# Patient Record
Sex: MALE | Race: WHITE | Employment: FULL TIME | URBAN - METROPOLITAN AREA
[De-identification: names, ages, dates, MRNs, and addresses within clinical notes are randomized per-mention and may not be internally consistent; named-entity substitution may affect disease eponyms.]

---

## 2019-07-30 ENCOUNTER — TRANSCRIBE ORDERS (OUTPATIENT)
Dept: LAB | Facility: HOSPITAL | Age: 54
End: 2019-07-30

## 2019-07-30 ENCOUNTER — APPOINTMENT (OUTPATIENT)
Dept: LAB | Facility: HOSPITAL | Age: 54
End: 2019-07-30

## 2019-07-30 DIAGNOSIS — Z01.84 IMMUNITY STATUS TESTING: Primary | ICD-10-CM

## 2019-07-30 DIAGNOSIS — Z01.84 IMMUNITY STATUS TESTING: ICD-10-CM

## 2019-07-30 PROCEDURE — 86735 MUMPS ANTIBODY: CPT

## 2019-07-30 PROCEDURE — 86787 VARICELLA-ZOSTER ANTIBODY: CPT

## 2019-07-30 PROCEDURE — 86765 RUBEOLA ANTIBODY: CPT

## 2019-07-30 PROCEDURE — 36415 COLL VENOUS BLD VENIPUNCTURE: CPT

## 2019-08-01 LAB
MEV IGG SER QL: ABNORMAL
MUV IGG SER QL: NORMAL
VZV IGG SER IA-ACNC: NORMAL

## 2020-01-16 ENCOUNTER — OFFICE VISIT (OUTPATIENT)
Dept: FAMILY MEDICINE CLINIC | Facility: CLINIC | Age: 55
End: 2020-01-16
Payer: COMMERCIAL

## 2020-01-16 VITALS
TEMPERATURE: 98 F | WEIGHT: 229 LBS | DIASTOLIC BLOOD PRESSURE: 110 MMHG | OXYGEN SATURATION: 98 % | HEIGHT: 72 IN | BODY MASS INDEX: 31.02 KG/M2 | HEART RATE: 116 BPM | RESPIRATION RATE: 18 BRPM | SYSTOLIC BLOOD PRESSURE: 180 MMHG

## 2020-01-16 DIAGNOSIS — E11.9 TYPE 2 DIABETES MELLITUS WITHOUT COMPLICATION, WITHOUT LONG-TERM CURRENT USE OF INSULIN (HCC): Primary | ICD-10-CM

## 2020-01-16 DIAGNOSIS — I10 ESSENTIAL HYPERTENSION: ICD-10-CM

## 2020-01-16 DIAGNOSIS — R35.1 NOCTURIA: ICD-10-CM

## 2020-01-16 LAB
CREAT UR-MCNC: 179 MG/DL
MICROALBUMIN UR-MCNC: 29 MG/L (ref 0–20)
MICROALBUMIN/CREAT 24H UR: 16 MG/G CREATININE (ref 0–30)
SL AMB  POCT GLUCOSE, UA: NORMAL
SL AMB LEUKOCYTE ESTERASE,UA: NORMAL
SL AMB POCT BILIRUBIN,UA: NORMAL
SL AMB POCT BLOOD,UA: NORMAL
SL AMB POCT CLARITY,UA: CLEAR
SL AMB POCT COLOR,UA: YELLOW
SL AMB POCT KETONES,UA: 160
SL AMB POCT NITRITE,UA: NORMAL
SL AMB POCT PH,UA: 5
SL AMB POCT SPECIFIC GRAVITY,UA: 1.03
SL AMB POCT URINE PROTEIN: NORMAL
SL AMB POCT UROBILINOGEN: 0.2

## 2020-01-16 PROCEDURE — 81002 URINALYSIS NONAUTO W/O SCOPE: CPT | Performed by: FAMILY MEDICINE

## 2020-01-16 PROCEDURE — 99204 OFFICE O/P NEW MOD 45 MIN: CPT | Performed by: FAMILY MEDICINE

## 2020-01-16 PROCEDURE — 93000 ELECTROCARDIOGRAM COMPLETE: CPT | Performed by: FAMILY MEDICINE

## 2020-01-16 PROCEDURE — 82043 UR ALBUMIN QUANTITATIVE: CPT | Performed by: FAMILY MEDICINE

## 2020-01-16 PROCEDURE — 82570 ASSAY OF URINE CREATININE: CPT | Performed by: FAMILY MEDICINE

## 2020-01-16 RX ORDER — VALSARTAN 160 MG/1
160 TABLET ORAL DAILY
Qty: 90 TABLET | Refills: 1 | Status: SHIPPED | OUTPATIENT
Start: 2020-01-16 | End: 2020-06-29

## 2020-01-16 NOTE — PROGRESS NOTES
Referral to clinical pharmacy pended for physician signature for disease management for DM and HTN, for medication titration including metformin and BP medications to goal, medication monitoring and device education with glucometer and blood pressure readings         Estrellita Hough, PharmD  Ambulatory Clinical Pharmacist  [phone]    Demographics  Interaction Method: Face to Face  Type of Intervention: New    Topic(s) Addressed  Device Education  Diabetes  Medication Management  Hypertension    Intervention(s) Made      Non-Pharmacologic: Other    Comments: Referral placed    Tool(s) Used  Not Applicable    Time Spent in Direct Patient Care Activities and Care Coordination: 0-15 Minutes    Recommendation(s) Accepted by the Patient/Caregiver: Not Applicable

## 2020-01-16 NOTE — ASSESSMENT & PLAN NOTE
Diabetes  Patient with new diagnosis of diabetes  He was given prescription for metformin 500 mg to start 1 tablet twice daily  He was given nutritional information to decrease carbohydrate intake  He will incorporate aerobic type of exercise 3-5 days a week  We will recheck blood test prior to next office visit    No results found for: HGBA1C

## 2020-01-16 NOTE — ASSESSMENT & PLAN NOTE
Hypertension  Patient was started on valsartan 160 mg once daily  He will continue check blood pressures at work and call if it maintains above 140/90 after 2-4 weeks  He will attempt to initiate exercise regimen  He was advised to try did work at decreasing his cigarette smoking with the goal of eventually total nicotine cessation

## 2020-01-16 NOTE — PROGRESS NOTES
FAMILY PRACTICE OFFICE VISIT       NAME: Christa Mckenzie  AGE: 49 y o  SEX: male       : 1965        MRN: [id number]    DATE: 2020  TIME: 8:36 AM    Assessment and Plan     Problem List Items Addressed This Visit        Endocrine    Type 2 diabetes mellitus without complication, without long-term current use of insulin ([facility] [address] ) - Primary     Diabetes  Patient with new diagnosis of diabetes  He was given prescription for metformin 500 mg to start 1 tablet twice daily  He was given nutritional information to decrease carbohydrate intake  He will incorporate aerobic type of exercise 3-5 days a week  We will recheck blood test prior to next office visit  No results found for: HGBA1C         Relevant Medications    metFORMIN (GLUCOPHAGE) 500 mg tablet    Other Relevant Orders    Hemoglobin A1C    CBC    Comprehensive metabolic panel    Lipid panel    TSH, 3rd generation       Cardiovascular and Mediastinum    Essential hypertension     Hypertension  Patient was started on valsartan 160 mg once daily  He will continue check blood pressures at work and call if it maintains above 140/90 after 2-4 weeks  He will attempt to initiate exercise regimen  He was advised to try did work at decreasing his cigarette smoking with the goal of eventually total nicotine cessation  Relevant Medications    valsartan (DIOVAN) 160 mg tablet    Other Relevant Orders    Hemoglobin A1C    CBC    Comprehensive metabolic panel    Lipid panel    TSH, 3rd generation      Other Visit Diagnoses     Nocturia        Relevant Orders    PSA, Total Screen              Chief Complaint     Chief Complaint   Patient presents with    Establish Care    Hypertension    Blood Sugar Problem       History of Present Illness     Patient works as a technician at [employer]    He states at least for the past 3-4 months he has been feeling nocturia x3 as well as some unexplained weight loss and thirst   He does have family history of diabetes and hypertension  He has checked his blood pressures at work and often has systolics in the 845K and diastolic in the 18B  He does smoke 1/2 pack of cigarettes per day  He does not obtain a regular form of exercise  He has not had a regular PCP physician for many years  He has not had any colon cancer screening test       Review of Systems   Review of Systems   Constitutional: Positive for fatigue and unexpected weight change  HENT: Negative  Respiratory: Negative  Cardiovascular: Negative  Gastrointestinal: Negative  Genitourinary: Positive for frequency  Negative for dysuria  Musculoskeletal: Negative  Neurological: Negative  Psychiatric/Behavioral:        Patient admits to some white coat syndrome anxiety       Active Problem List     Patient Active Problem List   Diagnosis    Type 2 diabetes mellitus without complication, without long-term current use of insulin [facility]    Essential hypertension       Past Medical History:  No past medical history on file  Past Surgical History:  No past surgical history on file  Family History:  No family history on file      Social History:  Social History     Socioeconomic History    Marital status: /Civil Union     Spouse name: Not on file    Number of children: Not on file    Years of education: Not on file    Highest education level: Not on file   Occupational History    Not on file   Social Needs    Financial resource strain: Not on file    Food insecurity:     Worry: Not on file     Inability: Not on file    Transportation needs:     Medical: Not on file     Non-medical: Not on file   Tobacco Use    Smoking status: Not on file   Substance and Sexual Activity    Alcohol use: Not on file    Drug use: Not on file    Sexual activity: Not on file   Lifestyle    Physical activity:     Days per week: Not on file     Minutes per session: Not on file    Stress: Not on file   Relationships    Social connections:     Talks on phone: Not on file     Gets together: Not on file     Attends Latter-day service: Not on file     Active member of club or organization: Not on file     Attends meetings of clubs or organizations: Not on file     Relationship status: Not on file    Intimate partner violence:     Fear of current or ex partner: Not on file     Emotionally abused: Not on file     Physically abused: Not on file     Forced sexual activity: Not on file   Other Topics Concern    Not on file   Social History Narrative    Not on file       Objective     Vitals:    01/16/20 0739   BP: (!) 180/110   Pulse: (!) 116   Resp: 18   Temp: 98 °F (36 7 °C)   SpO2: 98%     Wt Readings from Last 3 Encounters:   01/16/20 104 kg (229 lb)       Physical Exam   Constitutional: He is oriented to person, place, and time  No distress  HENT:   Right Ear: External ear normal    Left Ear: External ear normal    Mouth/Throat: Oropharynx is clear and moist  No oropharyngeal exudate  Tympanic membranes within normal limits bilaterally   Neck:   No carotid bruit   Cardiovascular: Normal heart sounds  Regular rate and rhythm with no murmurs   Pulmonary/Chest: Breath sounds normal    Lungs are clear to auscultation without wheezes,rales, or rhonchi   Abdominal:   Abdomen is soft, nontender with positive bowel sounds  There is no rebound or guarding  No masses palpated   Musculoskeletal: He exhibits no edema  Lymphadenopathy:     He has no cervical adenopathy  Neurological: He is alert and oriented to person, place, and time  No cranial nerve deficit  Psychiatric: He has a normal mood and affect  His behavior is normal  Judgment and thought content normal      EKG showed sinus tachycardia at 102 beats per minute, positive left axis deviation, negative acute ischemic changes    BMI Counseling: Body mass index is 31 06 kg/m²   The BMI is above normal  Nutrition recommendations include decreasing portion sizes, consuming healthier snacks and moderation in carbohydrate intake  Exercise recommendations include exercising 3-5 times per week  No pharmacotherapy was ordered  Pertinent Laboratory/Diagnostic Studies:  No results found for: GLUCOSE, BUN, CREATININE, CALCIUM, NA, K, CO2, CL  No results found for: ALT, AST, GGT, ALKPHOS, BILITOT    No results found for: WBC, HGB, HCT, MCV, PLT    No results found for: TSH    No results found for: CHOL  No results found for: TRIG  No results found for: HDL  No results found for: LDLCALC  No results found for: HGBA1C    Results for orders placed or performed in visit on 07/30/19   Rubeola antibody IgG   Result Value Ref Range    Rubeola IgG NON-IMMUNE (A) IMMUNE   Mumps antibody, IgG   Result Value Ref Range    Mumps IgG IMMUNE IMMUNE   Varicella zoster antibody, IgG   Result Value Ref Range    Varicella IgG IMMUNE IMMUNE       Orders Placed This Encounter   Procedures    Hemoglobin A1C    CBC    Comprehensive metabolic panel    Lipid panel    TSH, 3rd generation    PSA, Total Screen       ALLERGIES:  Allergies   Allergen Reactions    Penicillin V Rash       Current Medications     Current Outpatient Medications   Medication Sig Dispense Refill    metFORMIN (GLUCOPHAGE) 500 mg tablet Take 1 tablet (500 mg total) by mouth 2 (two) times a day with meals 180 tablet 1    valsartan (DIOVAN) 160 mg tablet Take 1 tablet (160 mg total) by mouth daily 90 tablet 1     No current facility-administered medications for this visit            Health Maintenance     Health Maintenance   Topic Date Due    Depression Screening PHQ  1965    HEMOGLOBIN A1C  1965    CRC Screening: Colonoscopy  1965    Diabetic Foot Exam  07/18/1976    DM Eye Exam  07/18/1976    URINE MICROALBUMIN  07/18/1976    DTaP,Tdap,and Td Vaccines (1 - Tdap) 07/18/1977    HIV Screening  07/18/1981    BMI: Followup Plan  07/18/1984    Annual Physical  07/18/1984    Influenza Vaccine  07/01/2019    BMI: Adult  01/16/2021    Pneumococcal Vaccine: 65+ Years (1 of 2 - PCV13) 07/18/2031    Pneumococcal Vaccine: Pediatrics (0 to 5 Years) and At-Risk Patients (6 to 59 Years)  Aged Out    HIB Vaccine  Aged Out    Hepatitis B Vaccine  Aged Out    IPV Vaccine  Aged Out    Hepatitis A Vaccine  Aged Out    Meningococcal ACWY Vaccine  Aged Out    HPV Vaccine  Aged Out       There is no immunization history on file for this patient      Lian Warren MD

## 2020-01-20 ENCOUNTER — APPOINTMENT (OUTPATIENT)
Dept: LAB | Facility: HOSPITAL | Age: 55
End: 2020-01-20
Payer: COMMERCIAL

## 2020-01-20 DIAGNOSIS — I10 ESSENTIAL HYPERTENSION: ICD-10-CM

## 2020-01-20 DIAGNOSIS — E11.9 TYPE 2 DIABETES MELLITUS WITHOUT COMPLICATION, WITHOUT LONG-TERM CURRENT USE OF INSULIN (HCC): ICD-10-CM

## 2020-01-20 DIAGNOSIS — R35.1 NOCTURIA: ICD-10-CM

## 2020-01-20 LAB
ALBUMIN SERPL BCP-MCNC: 3.5 G/DL (ref 3.5–5)
ALP SERPL-CCNC: 78 U/L (ref 46–116)
ALT SERPL W P-5'-P-CCNC: 33 U/L (ref 12–78)
ANION GAP SERPL CALCULATED.3IONS-SCNC: 5 MMOL/L (ref 4–13)
AST SERPL W P-5'-P-CCNC: 10 U/L (ref 5–45)
BILIRUB SERPL-MCNC: 0.63 MG/DL (ref 0.2–1)
BUN SERPL-MCNC: 8 MG/DL (ref 5–25)
CALCIUM SERPL-MCNC: 9 MG/DL (ref 8.3–10.1)
CHLORIDE SERPL-SCNC: 106 MMOL/L (ref 100–108)
CHOLEST SERPL-MCNC: 191 MG/DL (ref 50–200)
CO2 SERPL-SCNC: 27 MMOL/L (ref 21–32)
CREAT SERPL-MCNC: 0.89 MG/DL (ref 0.6–1.3)
ERYTHROCYTE [DISTWIDTH] IN BLOOD BY AUTOMATED COUNT: 12.3 % (ref 11.6–15.1)
EST. AVERAGE GLUCOSE BLD GHB EST-MCNC: 289 MG/DL
GFR SERPL CREATININE-BSD FRML MDRD: 98 ML/MIN/1.73SQ M
GLUCOSE P FAST SERPL-MCNC: 229 MG/DL (ref 65–99)
HBA1C MFR BLD: 11.7 % (ref 4.2–6.3)
HCT VFR BLD AUTO: 47.7 % (ref 36.5–49.3)
HDLC SERPL-MCNC: 39 MG/DL
HGB BLD-MCNC: 15.6 G/DL (ref 12–17)
LDLC SERPL CALC-MCNC: 135 MG/DL (ref 0–100)
MCH RBC QN AUTO: 29.9 PG (ref 26.8–34.3)
MCHC RBC AUTO-ENTMCNC: 32.7 G/DL (ref 31.4–37.4)
MCV RBC AUTO: 91 FL (ref 82–98)
NONHDLC SERPL-MCNC: 152 MG/DL
PLATELET # BLD AUTO: 300 THOUSANDS/UL (ref 149–390)
PMV BLD AUTO: 9.6 FL (ref 8.9–12.7)
POTASSIUM SERPL-SCNC: 4.5 MMOL/L (ref 3.5–5.3)
PROT SERPL-MCNC: 7.1 G/DL (ref 6.4–8.2)
PSA SERPL-MCNC: 1.1 NG/ML (ref 0–4)
RBC # BLD AUTO: 5.22 MILLION/UL (ref 3.88–5.62)
SODIUM SERPL-SCNC: 138 MMOL/L (ref 136–145)
TRIGL SERPL-MCNC: 86 MG/DL
TSH SERPL DL<=0.05 MIU/L-ACNC: 1.06 UIU/ML (ref 0.36–3.74)
WBC # BLD AUTO: 7.99 THOUSAND/UL (ref 4.31–10.16)

## 2020-01-20 PROCEDURE — 36415 COLL VENOUS BLD VENIPUNCTURE: CPT

## 2020-01-20 PROCEDURE — 85027 COMPLETE CBC AUTOMATED: CPT

## 2020-01-20 PROCEDURE — 83036 HEMOGLOBIN GLYCOSYLATED A1C: CPT

## 2020-01-20 PROCEDURE — 80053 COMPREHEN METABOLIC PANEL: CPT

## 2020-01-20 PROCEDURE — 84443 ASSAY THYROID STIM HORMONE: CPT

## 2020-01-20 PROCEDURE — G0103 PSA SCREENING: HCPCS

## 2020-01-20 PROCEDURE — 80061 LIPID PANEL: CPT

## 2020-01-21 ENCOUNTER — TELEPHONE (OUTPATIENT)
Dept: FAMILY MEDICINE CLINIC | Facility: CLINIC | Age: 55
End: 2020-01-21

## 2020-01-21 NOTE — TELEPHONE ENCOUNTER
----- Message from Lian Warren MD sent at 7/90/9669  8:28 PM EST -----  Recent blood work was stable with the exception of elevated sugars as we discussed  He also had higher than recommended LDL cholesterol at 135 however that this value may reduce when his blood sugars come down  We will recheck on follow-up office visit    Please mail low-cholesterol diet she to follow more closely

## 2020-01-21 NOTE — TELEPHONE ENCOUNTER
Absolutely  Whenever blood sugars are elevated it could cause distortion in vision and blurry vision    He should notice some improvement once blood sugar started to come down however this may take several weeks

## 2020-01-21 NOTE — TELEPHONE ENCOUNTER
----- Message from Domenic Kenny MD sent at 9/14/1991  8:28 PM EST -----  Recent blood work was stable with the exception of elevated sugars as we discussed  He also had higher than recommended LDL cholesterol at 135 however that this value may reduce when his blood sugars come down  We will recheck on follow-up office visit    Please mail low-cholesterol diet she to follow more closely

## 2020-01-21 NOTE — TELEPHONE ENCOUNTER
Pt aware but asked if his blood sugars and metformin can effect his vision  Had some concerns   Please advise

## 2020-04-16 ENCOUNTER — TELEMEDICINE (OUTPATIENT)
Dept: FAMILY MEDICINE CLINIC | Facility: CLINIC | Age: 55
End: 2020-04-16
Payer: COMMERCIAL

## 2020-04-16 VITALS
DIASTOLIC BLOOD PRESSURE: 94 MMHG | HEART RATE: 98 BPM | BODY MASS INDEX: 30.96 KG/M2 | HEIGHT: 72 IN | TEMPERATURE: 98.3 F | SYSTOLIC BLOOD PRESSURE: 150 MMHG | WEIGHT: 228.6 LBS

## 2020-04-16 DIAGNOSIS — E11.9 TYPE 2 DIABETES MELLITUS WITHOUT COMPLICATION, WITHOUT LONG-TERM CURRENT USE OF INSULIN (HCC): Primary | ICD-10-CM

## 2020-04-16 DIAGNOSIS — I10 ESSENTIAL HYPERTENSION: ICD-10-CM

## 2020-04-16 PROCEDURE — 99213 OFFICE O/P EST LOW 20 MIN: CPT | Performed by: FAMILY MEDICINE

## 2020-04-21 ENCOUNTER — TELEPHONE (OUTPATIENT)
Dept: FAMILY MEDICINE CLINIC | Facility: CLINIC | Age: 55
End: 2020-04-21

## 2020-04-21 ENCOUNTER — APPOINTMENT (OUTPATIENT)
Dept: LAB | Facility: HOSPITAL | Age: 55
End: 2020-04-21
Payer: COMMERCIAL

## 2020-04-21 DIAGNOSIS — E11.9 TYPE 2 DIABETES MELLITUS WITHOUT COMPLICATION, WITHOUT LONG-TERM CURRENT USE OF INSULIN (HCC): ICD-10-CM

## 2020-04-21 LAB
CHOLEST SERPL-MCNC: 192 MG/DL (ref 50–200)
EST. AVERAGE GLUCOSE BLD GHB EST-MCNC: 180 MG/DL
HBA1C MFR BLD: 7.9 %
HDLC SERPL-MCNC: 42 MG/DL
LDLC SERPL CALC-MCNC: 136 MG/DL (ref 0–100)
NONHDLC SERPL-MCNC: 150 MG/DL
TRIGL SERPL-MCNC: 72 MG/DL

## 2020-04-21 PROCEDURE — 80061 LIPID PANEL: CPT

## 2020-04-21 PROCEDURE — 83036 HEMOGLOBIN GLYCOSYLATED A1C: CPT

## 2020-04-21 PROCEDURE — 36415 COLL VENOUS BLD VENIPUNCTURE: CPT

## 2020-06-29 DIAGNOSIS — I10 ESSENTIAL HYPERTENSION: ICD-10-CM

## 2020-06-29 DIAGNOSIS — E11.9 TYPE 2 DIABETES MELLITUS WITHOUT COMPLICATION, WITHOUT LONG-TERM CURRENT USE OF INSULIN (HCC): ICD-10-CM

## 2020-06-29 RX ORDER — VALSARTAN 160 MG/1
TABLET ORAL
Qty: 90 TABLET | Refills: 0 | Status: SHIPPED | OUTPATIENT
Start: 2020-06-29 | End: 2020-07-20

## 2020-07-16 DIAGNOSIS — E11.9 TYPE 2 DIABETES MELLITUS WITHOUT COMPLICATION, WITHOUT LONG-TERM CURRENT USE OF INSULIN (HCC): ICD-10-CM

## 2020-07-18 DIAGNOSIS — E11.9 TYPE 2 DIABETES MELLITUS WITHOUT COMPLICATION, WITHOUT LONG-TERM CURRENT USE OF INSULIN (HCC): ICD-10-CM

## 2020-07-18 DIAGNOSIS — I10 ESSENTIAL HYPERTENSION: ICD-10-CM

## 2020-07-20 RX ORDER — VALSARTAN 160 MG/1
TABLET ORAL
Qty: 90 TABLET | Refills: 0 | Status: SHIPPED | OUTPATIENT
Start: 2020-07-20 | End: 2020-10-12 | Stop reason: SDUPTHER

## 2020-10-06 ENCOUNTER — TELEPHONE (OUTPATIENT)
Dept: FAMILY MEDICINE CLINIC | Facility: CLINIC | Age: 55
End: 2020-10-06

## 2020-10-06 DIAGNOSIS — E11.9 TYPE 2 DIABETES MELLITUS WITHOUT COMPLICATION, WITHOUT LONG-TERM CURRENT USE OF INSULIN (HCC): Primary | ICD-10-CM

## 2020-10-06 DIAGNOSIS — I10 ESSENTIAL HYPERTENSION: ICD-10-CM

## 2020-10-08 ENCOUNTER — LAB (OUTPATIENT)
Dept: LAB | Facility: HOSPITAL | Age: 55
End: 2020-10-08
Payer: COMMERCIAL

## 2020-10-08 DIAGNOSIS — E11.9 TYPE 2 DIABETES MELLITUS WITHOUT COMPLICATION, WITHOUT LONG-TERM CURRENT USE OF INSULIN (HCC): ICD-10-CM

## 2020-10-08 DIAGNOSIS — I10 ESSENTIAL HYPERTENSION: ICD-10-CM

## 2020-10-08 LAB
ALBUMIN SERPL BCP-MCNC: 4 G/DL (ref 3.5–5)
ALP SERPL-CCNC: 81 U/L (ref 46–116)
ALT SERPL W P-5'-P-CCNC: 21 U/L (ref 12–78)
ANION GAP SERPL CALCULATED.3IONS-SCNC: 5 MMOL/L (ref 4–13)
AST SERPL W P-5'-P-CCNC: 14 U/L (ref 5–45)
BILIRUB SERPL-MCNC: 0.73 MG/DL (ref 0.2–1)
BUN SERPL-MCNC: 17 MG/DL (ref 5–25)
CALCIUM SERPL-MCNC: 8.5 MG/DL (ref 8.3–10.1)
CHLORIDE SERPL-SCNC: 105 MMOL/L (ref 100–108)
CHOLEST SERPL-MCNC: 210 MG/DL (ref 50–200)
CO2 SERPL-SCNC: 26 MMOL/L (ref 21–32)
CREAT SERPL-MCNC: 0.88 MG/DL (ref 0.6–1.3)
ERYTHROCYTE [DISTWIDTH] IN BLOOD BY AUTOMATED COUNT: 12.1 % (ref 11.6–15.1)
EST. AVERAGE GLUCOSE BLD GHB EST-MCNC: 278 MG/DL
GFR SERPL CREATININE-BSD FRML MDRD: 97 ML/MIN/1.73SQ M
GLUCOSE P FAST SERPL-MCNC: 297 MG/DL (ref 65–99)
HBA1C MFR BLD: 11.3 %
HCT VFR BLD AUTO: 47.7 % (ref 36.5–49.3)
HDLC SERPL-MCNC: 46 MG/DL
HGB BLD-MCNC: 15.9 G/DL (ref 12–17)
LDLC SERPL CALC-MCNC: 148 MG/DL (ref 0–100)
MCH RBC QN AUTO: 30.5 PG (ref 26.8–34.3)
MCHC RBC AUTO-ENTMCNC: 33.3 G/DL (ref 31.4–37.4)
MCV RBC AUTO: 91 FL (ref 82–98)
NONHDLC SERPL-MCNC: 164 MG/DL
PLATELET # BLD AUTO: 318 THOUSANDS/UL (ref 149–390)
PMV BLD AUTO: 9.4 FL (ref 8.9–12.7)
POTASSIUM SERPL-SCNC: 4 MMOL/L (ref 3.5–5.3)
PROT SERPL-MCNC: 7.5 G/DL (ref 6.4–8.2)
RBC # BLD AUTO: 5.22 MILLION/UL (ref 3.88–5.62)
SODIUM SERPL-SCNC: 136 MMOL/L (ref 136–145)
TRIGL SERPL-MCNC: 82 MG/DL
WBC # BLD AUTO: 9.31 THOUSAND/UL (ref 4.31–10.16)

## 2020-10-08 PROCEDURE — 83036 HEMOGLOBIN GLYCOSYLATED A1C: CPT

## 2020-10-08 PROCEDURE — 80053 COMPREHEN METABOLIC PANEL: CPT

## 2020-10-08 PROCEDURE — 85027 COMPLETE CBC AUTOMATED: CPT

## 2020-10-08 PROCEDURE — 80061 LIPID PANEL: CPT

## 2020-10-08 PROCEDURE — 36415 COLL VENOUS BLD VENIPUNCTURE: CPT

## 2020-10-12 ENCOUNTER — OFFICE VISIT (OUTPATIENT)
Dept: FAMILY MEDICINE CLINIC | Facility: CLINIC | Age: 55
End: 2020-10-12
Payer: COMMERCIAL

## 2020-10-12 VITALS
DIASTOLIC BLOOD PRESSURE: 90 MMHG | BODY MASS INDEX: 28.92 KG/M2 | HEART RATE: 113 BPM | HEIGHT: 72 IN | OXYGEN SATURATION: 98 % | WEIGHT: 213.5 LBS | TEMPERATURE: 97.5 F | SYSTOLIC BLOOD PRESSURE: 130 MMHG | RESPIRATION RATE: 18 BRPM

## 2020-10-12 DIAGNOSIS — E78.49 OTHER HYPERLIPIDEMIA: ICD-10-CM

## 2020-10-12 DIAGNOSIS — I10 ESSENTIAL HYPERTENSION: ICD-10-CM

## 2020-10-12 DIAGNOSIS — E11.9 TYPE 2 DIABETES MELLITUS WITHOUT COMPLICATION, WITHOUT LONG-TERM CURRENT USE OF INSULIN (HCC): ICD-10-CM

## 2020-10-12 DIAGNOSIS — I10 ESSENTIAL HYPERTENSION: Primary | ICD-10-CM

## 2020-10-12 PROCEDURE — 99214 OFFICE O/P EST MOD 30 MIN: CPT | Performed by: FAMILY MEDICINE

## 2020-10-12 RX ORDER — VALSARTAN 160 MG/1
160 TABLET ORAL DAILY
Qty: 90 TABLET | Refills: 1 | Status: SHIPPED | OUTPATIENT
Start: 2020-10-12 | End: 2021-03-24

## 2020-12-22 ENCOUNTER — IMMUNIZATIONS (OUTPATIENT)
Dept: FAMILY MEDICINE CLINIC | Facility: HOSPITAL | Age: 55
End: 2020-12-22

## 2020-12-22 DIAGNOSIS — Z23 ENCOUNTER FOR IMMUNIZATION: ICD-10-CM

## 2020-12-22 PROCEDURE — 0001A SARS-COV-2 / COVID-19 MRNA VACCINE (PFIZER-BIONTECH) 30 MCG: CPT | Performed by: EMERGENCY MEDICINE

## 2020-12-22 PROCEDURE — 91300 SARS-COV-2 / COVID-19 MRNA VACCINE (PFIZER-BIONTECH) 30 MCG: CPT | Performed by: EMERGENCY MEDICINE

## 2021-01-13 ENCOUNTER — IMMUNIZATIONS (OUTPATIENT)
Dept: FAMILY MEDICINE CLINIC | Facility: HOSPITAL | Age: 56
End: 2021-01-13

## 2021-01-13 DIAGNOSIS — Z23 ENCOUNTER FOR IMMUNIZATION: ICD-10-CM

## 2021-01-13 PROCEDURE — 91300 SARS-COV-2 / COVID-19 MRNA VACCINE (PFIZER-BIONTECH) 30 MCG: CPT

## 2021-01-13 PROCEDURE — 0002A SARS-COV-2 / COVID-19 MRNA VACCINE (PFIZER-BIONTECH) 30 MCG: CPT

## 2021-03-24 DIAGNOSIS — I10 ESSENTIAL HYPERTENSION: ICD-10-CM

## 2021-03-24 RX ORDER — VALSARTAN 160 MG/1
TABLET ORAL
Qty: 90 TABLET | Refills: 1 | Status: SHIPPED | OUTPATIENT
Start: 2021-03-24 | End: 2021-10-08

## 2021-06-01 DIAGNOSIS — R35.1 NOCTURIA: ICD-10-CM

## 2021-06-01 DIAGNOSIS — I10 ESSENTIAL HYPERTENSION: Primary | ICD-10-CM

## 2021-06-01 DIAGNOSIS — E11.9 TYPE 2 DIABETES MELLITUS WITHOUT COMPLICATION, WITHOUT LONG-TERM CURRENT USE OF INSULIN (HCC): ICD-10-CM

## 2021-06-15 ENCOUNTER — HOSPITAL ENCOUNTER (EMERGENCY)
Facility: HOSPITAL | Age: 56
Discharge: HOME | End: 2021-06-15
Attending: EMERGENCY MEDICINE
Payer: OTHER MISCELLANEOUS

## 2021-06-15 VITALS
OXYGEN SATURATION: 97 % | TEMPERATURE: 97.6 F | HEART RATE: 90 BPM | DIASTOLIC BLOOD PRESSURE: 93 MMHG | BODY MASS INDEX: 28.89 KG/M2 | SYSTOLIC BLOOD PRESSURE: 167 MMHG | RESPIRATION RATE: 18 BRPM | WEIGHT: 213 LBS

## 2021-06-15 DIAGNOSIS — S50.812A ABRASION OF LEFT FOREARM, INITIAL ENCOUNTER: Primary | ICD-10-CM
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
## 2021-06-15 PROCEDURE — 99283 EMERGENCY DEPT VISIT LOW MDM: CPT

## 2021-06-15 PROCEDURE — 99282 EMERGENCY DEPT VISIT SF MDM: CPT | Performed by: EMERGENCY MEDICINE
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
## 2021-07-01 ENCOUNTER — APPOINTMENT (OUTPATIENT)
Dept: LAB | Facility: HOSPITAL | Age: 56
End: 2021-07-01
Payer: OTHER MISCELLANEOUS

## 2021-07-01 DIAGNOSIS — E11.9 TYPE 2 DIABETES MELLITUS WITHOUT COMPLICATION, WITHOUT LONG-TERM CURRENT USE OF INSULIN (HCC): ICD-10-CM

## 2021-07-01 DIAGNOSIS — R35.1 NOCTURIA: ICD-10-CM

## 2021-07-01 DIAGNOSIS — I10 ESSENTIAL HYPERTENSION: ICD-10-CM

## 2021-07-01 LAB
ALBUMIN SERPL BCP-MCNC: 3.5 G/DL (ref 3.5–5)
ALP SERPL-CCNC: 78 U/L (ref 46–116)
ALT SERPL W P-5'-P-CCNC: 20 U/L (ref 12–78)
ANION GAP SERPL CALCULATED.3IONS-SCNC: 6 MMOL/L (ref 4–13)
AST SERPL W P-5'-P-CCNC: 10 U/L (ref 5–45)
BILIRUB SERPL-MCNC: 0.44 MG/DL (ref 0.2–1)
BUN SERPL-MCNC: 14 MG/DL (ref 5–25)
CALCIUM SERPL-MCNC: 8.8 MG/DL (ref 8.3–10.1)
CHLORIDE SERPL-SCNC: 107 MMOL/L (ref 100–108)
CHOLEST SERPL-MCNC: 184 MG/DL (ref 50–200)
CO2 SERPL-SCNC: 23 MMOL/L (ref 21–32)
CREAT SERPL-MCNC: 0.79 MG/DL (ref 0.6–1.3)
ERYTHROCYTE [DISTWIDTH] IN BLOOD BY AUTOMATED COUNT: 12.2 % (ref 11.6–15.1)
EST. AVERAGE GLUCOSE BLD GHB EST-MCNC: 255 MG/DL
GFR SERPL CREATININE-BSD FRML MDRD: 102 ML/MIN/1.73SQ M
GLUCOSE P FAST SERPL-MCNC: 260 MG/DL (ref 65–99)
HBA1C MFR BLD: 10.5 %
HCT VFR BLD AUTO: 46.7 % (ref 36.5–49.3)
HDLC SERPL-MCNC: 41 MG/DL
HGB BLD-MCNC: 16 G/DL (ref 12–17)
LDLC SERPL CALC-MCNC: 124 MG/DL (ref 0–100)
MCH RBC QN AUTO: 31.4 PG (ref 26.8–34.3)
MCHC RBC AUTO-ENTMCNC: 34.3 G/DL (ref 31.4–37.4)
MCV RBC AUTO: 92 FL (ref 82–98)
NONHDLC SERPL-MCNC: 143 MG/DL
PLATELET # BLD AUTO: 290 THOUSANDS/UL (ref 149–390)
PMV BLD AUTO: 9.6 FL (ref 8.9–12.7)
POTASSIUM SERPL-SCNC: 3.8 MMOL/L (ref 3.5–5.3)
PROT SERPL-MCNC: 7.1 G/DL (ref 6.4–8.2)
PSA SERPL-MCNC: 1.5 NG/ML (ref 0–4)
RBC # BLD AUTO: 5.1 MILLION/UL (ref 3.88–5.62)
SODIUM SERPL-SCNC: 136 MMOL/L (ref 136–145)
TRIGL SERPL-MCNC: 94 MG/DL
TSH SERPL DL<=0.05 MIU/L-ACNC: 1.51 UIU/ML (ref 0.36–3.74)
WBC # BLD AUTO: 6.47 THOUSAND/UL (ref 4.31–10.16)

## 2021-07-01 PROCEDURE — 84443 ASSAY THYROID STIM HORMONE: CPT

## 2021-07-01 PROCEDURE — G0103 PSA SCREENING: HCPCS

## 2021-07-01 PROCEDURE — 80061 LIPID PANEL: CPT

## 2021-07-01 PROCEDURE — 85027 COMPLETE CBC AUTOMATED: CPT

## 2021-07-01 PROCEDURE — 36415 COLL VENOUS BLD VENIPUNCTURE: CPT

## 2021-07-01 PROCEDURE — 80053 COMPREHEN METABOLIC PANEL: CPT

## 2021-07-01 PROCEDURE — 83036 HEMOGLOBIN GLYCOSYLATED A1C: CPT

## 2021-07-06 ENCOUNTER — TELEPHONE (OUTPATIENT)
Dept: FAMILY MEDICINE CLINIC | Facility: CLINIC | Age: 56
End: 2021-07-06

## 2021-07-06 NOTE — TELEPHONE ENCOUNTER
----- Message from Edy Ramirez MD sent at 7/1/7049  6:44 AM EDT -----   Repeat blood work shows continued elevation in blood sugars with an A1c of 10 5  His LDL cholesterol is still  high at 124 (<80)  I recommended patient have an appointment with [facility] endocrinology office for further evaluation  I will place order in [facility] and patient may call for an appointment    Please provide phone number for office in [facility]

## 2021-07-09 ENCOUNTER — OFFICE VISIT (OUTPATIENT)
Dept: FAMILY MEDICINE CLINIC | Facility: CLINIC | Age: 56
End: 2021-07-09
Payer: COMMERCIAL

## 2021-07-09 VITALS
OXYGEN SATURATION: 97 % | HEIGHT: 72 IN | RESPIRATION RATE: 16 BRPM | WEIGHT: 208.8 LBS | BODY MASS INDEX: 28.28 KG/M2 | SYSTOLIC BLOOD PRESSURE: 124 MMHG | DIASTOLIC BLOOD PRESSURE: 82 MMHG | HEART RATE: 108 BPM | TEMPERATURE: 97.5 F

## 2021-07-09 DIAGNOSIS — E11.9 TYPE 2 DIABETES MELLITUS WITHOUT COMPLICATION, WITHOUT LONG-TERM CURRENT USE OF INSULIN (HCC): Primary | ICD-10-CM

## 2021-07-09 DIAGNOSIS — I10 ESSENTIAL HYPERTENSION: ICD-10-CM

## 2021-07-09 PROCEDURE — 99214 OFFICE O/P EST MOD 30 MIN: CPT | Performed by: FAMILY MEDICINE

## 2021-07-09 RX ORDER — EMPAGLIFLOZIN 10 MG/1
10 TABLET, FILM COATED ORAL EVERY MORNING
Qty: 90 TABLET | Refills: 3 | Status: SHIPPED | OUTPATIENT
Start: 2021-07-09 | End: 2021-09-14 | Stop reason: DRUGHIGH

## 2021-07-09 NOTE — ASSESSMENT & PLAN NOTE
Diabetes  I had a long discussion with the patient and we decided to initiate Jardiance 10 mg in the morning and continue with metformin 1000 mg b i d  He will continue with his efforts at diet and exercise    He will follow-up with endocrinologist for further monitoring  Lab Results   Component Value Date    HGBA1C 10 5 (H) 07/01/2021

## 2021-07-09 NOTE — PROGRESS NOTES
FAMILY PRACTICE OFFICE VISIT       NAME: Shanika Horner  AGE: 48 y o  SEX: male       : 1965        MRN: [id number]    DATE: 2021  TIME: 12:58 PM    Assessment and Plan     Problem List Items Addressed This Visit        Endocrine    Type 2 diabetes mellitus without complication, without long-term current use of insulin ([facility] [address] ) - Primary      Diabetes  I had a long discussion with the patient and we decided to initiate Jardiance 10 mg in the morning and continue with metformin 1000 mg b i d  He will continue with his efforts at diet and exercise  He will follow-up with endocrinologist for further monitoring  Lab Results   Component Value Date    HGBA1C 10 5 (H) 2021            Relevant Medications    Empagliflozin (Jardiance) 10 MG TABS       Cardiovascular and Mediastinum    Essential hypertension      Hypertension  Patient blood pressure is well controlled on current dose of valsartan 160 mg daily                   Chief Complaint     Chief Complaint   Patient presents with    Follow-up     Discuss BW       History of Present Illness      Patient in the office to review chronic medical condition  I reviewed his most recent blood test results showing very slight improvement in A1c to currently at 10 5  Patient states he primarily eats only breakfast lunch and dinner and does not snack  In between meals nor does he eat much of anything after supper which is usually at 7:30 p m  Sherre Soulier Patient has been trying to incorporate a more regular form of exercise out on his stationary bicycle or elliptical machine in his home  He is compliant with taking his current regimen of medications  He does have an appointment to see [facility] Endocrinology at the end of 2021      Review of Systems   Review of Systems   Constitutional: Negative  HENT: Negative  Eyes: Negative  Respiratory: Negative  Cardiovascular: Negative  Gastrointestinal: Negative      Genitourinary:        Nocturia X 1   Musculoskeletal: Negative  Skin: Negative  Neurological: Negative  Psychiatric/Behavioral: Negative  Active Problem List     Patient Active Problem List   Diagnosis    Type 2 diabetes mellitus without complication, without long-term current use of insulin ([address] )    Essential hypertension    Other hyperlipidemia       Past Medical History:  History reviewed  No pertinent past medical history  Past Surgical History:  History reviewed  No pertinent surgical history  Family History:  History reviewed  No pertinent family history  Social History:  Social History     Socioeconomic History    Marital status: /Civil Union     Spouse name: Not on file    Number of children: Not on file    Years of education: Not on file    Highest education level: Not on file   Occupational History    Not on file   Tobacco Use    Smoking status: Current Some Day Smoker    Smokeless tobacco: Never Used   Vaping Use    Vaping Use: Never used   Substance and Sexual Activity    Alcohol use: Yes    Drug use: Never    Sexual activity: Not on file   Other Topics Concern    Not on file   Social History Narrative    Not on file     Social Determinants of Health     Financial Resource Strain:     Difficulty of Paying Living Expenses:    Food Insecurity:     Worried About Running Out of Food in the Last Year:     [address] in the Last Year:    Transportation Needs:     Lack of Transportation (Medical):      Lack of Transportation (Non-Medical):    Physical Activity:     Days of Exercise per Week:     Minutes of Exercise per Session:    Stress:     Feeling of Stress :    Social Connections:     Frequency of Communication with Friends and Family:     Frequency of Social Gatherings with Friends and Family:     Attends [employer] Services:     Active Member of Clubs or Organizations:     Attends Club or Organization Meetings:     Marital Status:    Intimate Partner Violence:     Fear of Current or Ex-Partner:     Emotionally Abused:     Physically Abused:     Sexually Abused:        Objective     Vitals:    07/09/21 0755   BP: 124/82   Pulse: (!) 108   Resp: 16   Temp: 97 5 °F (36 4 °C)   SpO2: 97%     Wt Readings from Last 3 Encounters:   07/09/21 94 7 kg (208 lb 12 8 oz)   06/15/21 96 6 kg (213 lb)   10/12/20 96 8 kg (213 lb 8 oz)       Physical Exam  Constitutional:       General: He is not in acute distress  Appearance: Normal appearance  He is not ill-appearing  HENT:      Head: Normocephalic and atraumatic  Cardiovascular:      Rate and Rhythm: Normal rate and regular rhythm  Heart sounds: Normal heart sounds  No murmur heard  Pulmonary:      Effort: Pulmonary effort is normal  No respiratory distress  Breath sounds: Normal breath sounds  No wheezing or rhonchi  Musculoskeletal:      Right lower leg: No edema  Left lower leg: No edema  Neurological:      General: No focal deficit present  Mental Status: He is alert and oriented to person, place, and time  Psychiatric:         Mood and Affect: Mood normal          Behavior: Behavior normal          Thought Content:  Thought content normal          Judgment: Judgment normal          Pertinent Laboratory/Diagnostic Studies:  Lab Results   Component Value Date    BUN 14 07/01/2021    CREATININE 0 79 07/01/2021    CALCIUM 8 8 07/01/2021    K 3 8 07/01/2021    CO2 23 07/01/2021     07/01/2021     Lab Results   Component Value Date    ALT 20 07/01/2021    AST 10 07/01/2021    ALKPHOS 78 07/01/2021       Lab Results   Component Value Date    WBC 6 47 07/01/2021    HGB 16 0 07/01/2021    HCT 46 7 07/01/2021    MCV 92 07/01/2021     07/01/2021       No results found for: TSH    No results found for: CHOL  Lab Results   Component Value Date    TRIG 94 07/01/2021     Lab Results   Component Value Date    HDL 41 07/01/2021     Lab Results   Component Value Date    LDLCALC 124 (H) 07/01/2021     Lab Results   Component Value Date    HGBA1C 10 5 (H) 07/01/2021       Results for orders placed or performed in visit on 07/01/21   Hemoglobin A1C   Result Value Ref Range    Hemoglobin A1C 10 5 (H) Normal 3 8-5 6%; PreDiabetic 5 7-6 4%; Diabetic >=6 5%; Glycemic control for adults with diabetes <7 0% %     mg/dl   Comprehensive metabolic panel   Result Value Ref Range    Sodium 136 136 - 145 mmol/L    Potassium 3 8 3 5 - 5 3 mmol/L    Chloride 107 100 - 108 mmol/L    CO2 23 21 - 32 mmol/L    ANION GAP 6 4 - 13 mmol/L    BUN 14 5 - 25 mg/dL    Creatinine 0 79 0 60 - 1 30 mg/dL    Glucose, Fasting 260 (H) 65 - 99 mg/dL    Calcium 8 8 8 3 - 10 1 mg/dL    AST 10 5 - 45 U/L    ALT 20 12 - 78 U/L    Alkaline Phosphatase 78 46 - 116 U/L    Total Protein 7 1 6 4 - 8 2 g/dL    Albumin 3 5 3 5 - 5 0 g/dL    Total Bilirubin 0 44 0 20 - 1 00 mg/dL    eGFR 102 ml/min/1 73sq m   Lipid panel   Result Value Ref Range    Cholesterol 184 50 - 200 mg/dL    Triglycerides 94 <=150 mg/dL    HDL, Direct 41 >=40 mg/dL    LDL Calculated 124 (H) 0 - 100 mg/dL    Non-HDL-Chol (CHOL-HDL) 143 mg/dl   CBC   Result Value Ref Range    WBC 6 47 4 31 - 10 16 Thousand/uL    RBC 5 10 3 88 - 5 62 Million/uL    Hemoglobin 16 0 12 0 - 17 0 g/dL    Hematocrit 46 7 36 5 - 49 3 %    MCV 92 82 - 98 fL    MCH 31 4 26 8 - 34 3 pg    MCHC 34 3 31 4 - 37 4 g/dL    RDW 12 2 11 6 - 15 1 %    Platelets 565 701 - 124 Thousands/uL    MPV 9 6 8 9 - 12 7 fL   TSH, 3rd generation   Result Value Ref Range    TSH 3RD GENERATON 1 510 0 358 - 3 740 uIU/mL   PSA, Total Screen   Result Value Ref Range    PSA 1 5 0 0 - 4 0 ng/mL       No orders of the defined types were placed in this encounter        ALLERGIES:  Allergies   Allergen Reactions    Penicillin V Rash       Current Medications     Current Outpatient Medications   Medication Sig Dispense Refill    metFORMIN (GLUCOPHAGE) 1000 MG tablet Take 1 tablet (1,000 mg total) by mouth 2 (two) times a day with meals 180 tablet 3    valsartan (DIOVAN) 160 mg tablet TAKE ONE TABLET BY MOUTH DAILY 90 tablet 1    Empagliflozin (Jardiance) 10 MG TABS Take 1 tablet (10 mg total) by mouth every morning 90 tablet 3     No current facility-administered medications for this visit           Health Maintenance     Health Maintenance   Topic Date Due    Hepatitis C Screening  Never done    Pneumococcal Vaccine: Pediatrics (0 to 5 Years) and At-Risk Patients (6 to 59 Years) (1 of 2 - PPSV23) Never done    DM Eye Exam  Never done    HIV Screening  Never done    Annual Physical  Never done    DTaP,Tdap,and Td Vaccines (1 - Tdap) Never done    Colorectal Cancer Screening  Never done    BMI: Followup Plan  01/16/2021    Influenza Vaccine (1) 09/01/2021    Diabetic Foot Exam  10/12/2021    HEMOGLOBIN A1C  01/01/2022    Depression Screening PHQ  07/09/2022    BMI: Adult  07/09/2022    COVID-19 Vaccine  Completed    HIB Vaccine  Aged Out    Hepatitis B Vaccine  Aged Out    IPV Vaccine  Aged Out    Hepatitis A Vaccine  Aged Out    Meningococcal ACWY Vaccine  Aged Out    HPV Vaccine  Aged Out     Immunization History   Administered Date(s) Administered    Fluzone Split Quad 0 5 mL 10/16/2019    SARS-CoV-2 / COVID-19 mRNA IM ([employer] 12/22/2020, 76/43/2333       Kathryn Mcbride MD     spent 25 minutes with this patient which greater than 50% was spent counseling

## 2021-07-20 ENCOUNTER — CONSULT (OUTPATIENT)
Dept: ENDOCRINOLOGY | Facility: CLINIC | Age: 56
End: 2021-07-20
Payer: COMMERCIAL

## 2021-07-20 VITALS
SYSTOLIC BLOOD PRESSURE: 120 MMHG | DIASTOLIC BLOOD PRESSURE: 78 MMHG | HEIGHT: 72 IN | HEART RATE: 111 BPM | BODY MASS INDEX: 28.04 KG/M2 | WEIGHT: 207 LBS

## 2021-07-20 DIAGNOSIS — E78.49 OTHER HYPERLIPIDEMIA: ICD-10-CM

## 2021-07-20 DIAGNOSIS — E11.9 TYPE 2 DIABETES MELLITUS WITHOUT COMPLICATION, WITHOUT LONG-TERM CURRENT USE OF INSULIN (HCC): Primary | ICD-10-CM

## 2021-07-20 DIAGNOSIS — I10 ESSENTIAL HYPERTENSION: ICD-10-CM

## 2021-07-20 PROCEDURE — 99244 OFF/OP CNSLTJ NEW/EST MOD 40: CPT | Performed by: INTERNAL MEDICINE

## 2021-07-20 NOTE — PATIENT INSTRUCTIONS
The [employer] diabetes management program helps members understand their blood sugar, develop healthy lifestyle habits, and improve glycemic control  [employer] ships a cellular-connected device that automatically captures readings in the cloud, as well as unlimited diabetes supplies (strips and lancets) directly to members' homes, and gives them access to 24/7/365 remote support from expert coaches (unlimited phone calls, video calls, or text messages) -- all at no additional cost to the member  [employer]'s blood glucose device provides real-time feedback to the member based on their health metrics, preferences, and clinical needs -- and a [employer]  will proactively outreach to the member if their reading is out of range to check on their well-being  With one click on their blood glucose meter, a member can contact a , share their [address] with their care team or loved ones, order test strips, or contact [employer] support at [phone]  Sign up at: [email or web address] - use registration code [email or web address]

## 2021-07-20 NOTE — LETTER
July 20, 6531     Glenn Alon[address] [place] [address]    Patient: Aidee Bee   YOB: 1965   Date of Visit: 7/20/2021       Dear Dr Efrain Parker: Thank you for referring Margiselena Demarco to me for evaluation  Below are my notes for this consultation  If you have questions, please do not hesitate to call me  I look forward to following your patient along with you  Sincerely,        Tally Leventhal, MD        CC: Arsh Su MD  7/20/2021  1:47 PM  Sign when Signing Visit   Aidee Bee 55 y o  male MRN: [id number]    Encounter: [id number]      Assessment/Plan     Assessment: This is a 55y o -year-old male with diabetes with hyperglycemia , hypertension and hyperlipidemia  Plan:  1  Type 2 diabetes with hyperglycemia- since he recently started Jardiance, I like to see what the effect is prior to adding any additional medications  He is agreeable to going to diabetes education and medical nutrition therapy  He was referred to Medical fitness which should help improve the blood sugar is well  He is to send me his blood sugar log after signing up with [facility]  2   Hypertension is well controlled  3   Hyperlipidemia- repeat lipid panel at the next visit  CC: Diabetes    History of Present Illness     HPI:    73-year-old male with type 2 diabetes presents for consultation  He was diagnosed about two years ago at which time he was started on metformin  This did bring his blood sugar down  More recently, he has noticed an increase in his A1c  He is starting to have additional polyuria, polydipsia and nocturia  This could be due to the recent addition of Jardiance  He denies any complications of diabetes  For hypertension, he is on valsartan  He denies any cough  Review of Systems   Constitutional: Negative for chills and fever  Respiratory: Negative for shortness of breath  Cardiovascular: Negative for chest pain  Gastrointestinal: Negative for constipation, diarrhea, nausea and vomiting  Endocrine: Positive for polydipsia and polyuria  All other systems reviewed and are negative  Historical Information   History reviewed  No pertinent past medical history  Past Surgical History:   Procedure Laterality Date    KNEE SURGERY Left      Social History   Social History     Substance and Sexual Activity   Alcohol Use Yes     Social History     Substance and Sexual Activity   Drug Use Never     Social History     Tobacco Use   Smoking Status Current Some Day Smoker   Smokeless Tobacco Never Used     Family History:   Family History   Problem Relation Age of Onset    Diabetes type II Mother     Hypertension Mother     Diabetes type II Father     Colon cancer Father     Hypertension Father        Meds/Allergies   Current Outpatient Medications   Medication Sig Dispense Refill    Empagliflozin (Jardiance) 10 MG TABS Take 1 tablet (10 mg total) by mouth every morning 90 tablet 3    metFORMIN (GLUCOPHAGE) 1000 MG tablet Take 1 tablet (1,000 mg total) by mouth 2 (two) times a day with meals 180 tablet 3    valsartan (DIOVAN) 160 mg tablet TAKE ONE TABLET BY MOUTH DAILY 90 tablet 1     No current facility-administered medications for this visit  Allergies   Allergen Reactions    Penicillin V Rash       Objective   Vitals: Blood pressure 120/78, pulse (!) 111, height 6' (1 829 m), weight 93 9 kg (207 lb)  Physical Exam  Vitals reviewed  Constitutional:       General: He is not in acute distress  Appearance: He is well-developed  He is not diaphoretic  HENT:      Head: Normocephalic and atraumatic  Eyes:      General: Lids are normal  No scleral icterus  Right eye: No discharge  Left eye: No discharge  Conjunctiva/sclera: Conjunctivae normal    Neck:      Thyroid: No thyromegaly  Cardiovascular:      Rate and Rhythm: Normal rate and regular rhythm        Heart sounds: Normal heart sounds  No murmur heard  No friction rub  No gallop  Pulmonary:      Effort: Pulmonary effort is normal  No respiratory distress  Breath sounds: Normal breath sounds  No wheezing  Abdominal:      General: Bowel sounds are normal  There is no distension  Palpations: Abdomen is soft  Tenderness: There is no abdominal tenderness  Musculoskeletal:         General: No tenderness or deformity  Normal range of motion  Cervical back: Neck supple  Lymphadenopathy:      Head:      Right side of head: No occipital adenopathy  Left side of head: No occipital adenopathy  Upper Body:      Right upper body: No supraclavicular adenopathy  Left upper body: No supraclavicular adenopathy  Skin:     General: Skin is warm  Findings: No erythema or rash  Neurological:      Mental Status: He is alert and oriented to person, place, and time  Coordination: Coordination normal    Psychiatric:         Mood and Affect: Mood normal          Behavior: Behavior normal          The history was obtained from the review of the chart, patient      Lab Results:   Lab Results   Component Value Date/Time    Hemoglobin A1C 10 5 (H) 07/01/2021 08:39 AM    Hemoglobin A1C 11 3 (H) 10/08/2020 08:30 AM    WBC 6 47 07/01/2021 08:39 AM    WBC 9 31 10/08/2020 08:30 AM    Hemoglobin 16 0 07/01/2021 08:39 AM    Hemoglobin 15 9 10/08/2020 08:30 AM    Hematocrit 46 7 07/01/2021 08:39 AM    Hematocrit 47 7 10/08/2020 08:30 AM    MCV 92 07/01/2021 08:39 AM    MCV 91 10/08/2020 08:30 AM    Platelets 030 93/57/0095 08:39 AM    Platelets 715 06/30/2656 08:30 AM    BUN 14 07/01/2021 08:39 AM    BUN 17 10/08/2020 08:30 AM    Potassium 3 8 07/01/2021 08:39 AM    Potassium 4 0 10/08/2020 08:30 AM    Chloride 107 07/01/2021 08:39 AM    Chloride 105 10/08/2020 08:30 AM    CO2 23 07/01/2021 08:39 AM    CO2 26 10/08/2020 08:30 AM    Creatinine 0 79 07/01/2021 08:39 AM    Creatinine 0 88 10/08/2020 08:30 AM    AST 10 07/01/2021 08:39 AM    AST 14 10/08/2020 08:30 AM    ALT 20 07/01/2021 08:39 AM    ALT 21 10/08/2020 08:30 AM    Albumin 3 5 07/01/2021 08:39 AM    Albumin 4 0 10/08/2020 08:30 AM    HDL, Direct 41 07/01/2021 08:39 AM    HDL, Direct 46 10/08/2020 08:30 AM    Triglycerides 94 07/01/2021 08:39 AM    Triglycerides 82 10/08/2020 08:30 AM           Imaging Studies: I have personally reviewed pertinent reports  Portions of the record may have been created with voice recognition software  Occasional wrong word or "sound a like" substitutions may have occurred due to the inherent limitations of voice recognition software  Read the chart carefully and recognize, using context, where substitutions have occurred

## 2021-07-20 NOTE — PROGRESS NOTES
Lulú Aguilar 55 y o  male MRN: [id number]    Encounter: [id number]      Assessment/Plan     Assessment: This is a 55y o -year-old male with diabetes with hyperglycemia , hypertension and hyperlipidemia  Plan:  1  Type 2 diabetes with hyperglycemia- since he recently started Jardiance, I like to see what the effect is prior to adding any additional medications  He is agreeable to going to diabetes education and medical nutrition therapy  He was referred to Medical fitness which should help improve the blood sugar is well  He is to send me his blood sugar log after signing up with Porter Burkitt  2   Hypertension is well controlled  3   Hyperlipidemia- repeat lipid panel at the next visit  CC: Diabetes    History of Present Illness     HPI:    58-year-old male with type 2 diabetes presents for consultation  He was diagnosed about two years ago at which time he was started on metformin  This did bring his blood sugar down  More recently, he has noticed an increase in his A1c  He is starting to have additional polyuria, polydipsia and nocturia  This could be due to the recent addition of Jardiance  He denies any complications of diabetes  For hypertension, he is on valsartan  He denies any cough  Review of Systems   Constitutional: Negative for chills and fever  Respiratory: Negative for shortness of breath  Cardiovascular: Negative for chest pain  Gastrointestinal: Negative for constipation, diarrhea, nausea and vomiting  Endocrine: Positive for polydipsia and polyuria  All other systems reviewed and are negative  Historical Information   History reviewed  No pertinent past medical history    Past Surgical History:   Procedure Laterality Date    KNEE SURGERY Left      Social History   Social History     Substance and Sexual Activity   Alcohol Use Yes     Social History     Substance and Sexual Activity   Drug Use Never     Social History     Tobacco Use   Smoking Status Current Some Day Smoker   Smokeless Tobacco Never Used     Family History:   Family History   Problem Relation Age of Onset    Diabetes type II Mother     Hypertension Mother     Diabetes type II Father     Colon cancer Father     Hypertension Father        Meds/Allergies   Current Outpatient Medications   Medication Sig Dispense Refill    Empagliflozin (Jardiance) 10 MG TABS Take 1 tablet (10 mg total) by mouth every morning 90 tablet 3    metFORMIN (GLUCOPHAGE) 1000 MG tablet Take 1 tablet (1,000 mg total) by mouth 2 (two) times a day with meals 180 tablet 3    valsartan (DIOVAN) 160 mg tablet TAKE ONE TABLET BY MOUTH DAILY 90 tablet 1     No current facility-administered medications for this visit  Allergies   Allergen Reactions    Penicillin V Rash       Objective   Vitals: Blood pressure 120/78, pulse (!) 111, height 6' (1 829 m), weight 93 9 kg (207 lb)  Physical Exam  Vitals reviewed  Constitutional:       General: He is not in acute distress  Appearance: He is well-developed  He is not diaphoretic  HENT:      Head: Normocephalic and atraumatic  Eyes:      General: Lids are normal  No scleral icterus  Right eye: No discharge  Left eye: No discharge  Conjunctiva/sclera: Conjunctivae normal    Neck:      Thyroid: No thyromegaly  Cardiovascular:      Rate and Rhythm: Normal rate and regular rhythm  Heart sounds: Normal heart sounds  No murmur heard  No friction rub  No gallop  Pulmonary:      Effort: Pulmonary effort is normal  No respiratory distress  Breath sounds: Normal breath sounds  No wheezing  Abdominal:      General: Bowel sounds are normal  There is no distension  Palpations: Abdomen is soft  Tenderness: There is no abdominal tenderness  Musculoskeletal:         General: No tenderness or deformity  Normal range of motion  Cervical back: Neck supple     Lymphadenopathy:      Head:      Right side of head: No occipital adenopathy  Left side of head: No occipital adenopathy  Upper Body:      Right upper body: No supraclavicular adenopathy  Left upper body: No supraclavicular adenopathy  Skin:     General: Skin is warm  Findings: No erythema or rash  Neurological:      Mental Status: He is alert and oriented to person, place, and time  Coordination: Coordination normal    Psychiatric:         Mood and Affect: Mood normal          Behavior: Behavior normal          The history was obtained from the review of the chart, patient  Lab Results:   Lab Results   Component Value Date/Time    Hemoglobin A1C 10 5 (H) 07/01/2021 08:39 AM    Hemoglobin A1C 11 3 (H) 10/08/2020 08:30 AM    WBC 6 47 07/01/2021 08:39 AM    WBC 9 31 10/08/2020 08:30 AM    Hemoglobin 16 0 07/01/2021 08:39 AM    Hemoglobin 15 9 10/08/2020 08:30 AM    Hematocrit 46 7 07/01/2021 08:39 AM    Hematocrit 47 7 10/08/2020 08:30 AM    MCV 92 07/01/2021 08:39 AM    MCV 91 10/08/2020 08:30 AM    Platelets 751 24/19/4319 08:39 AM    Platelets 521 82/80/9629 08:30 AM    BUN 14 07/01/2021 08:39 AM    BUN 17 10/08/2020 08:30 AM    Potassium 3 8 07/01/2021 08:39 AM    Potassium 4 0 10/08/2020 08:30 AM    Chloride 107 07/01/2021 08:39 AM    Chloride 105 10/08/2020 08:30 AM    CO2 23 07/01/2021 08:39 AM    CO2 26 10/08/2020 08:30 AM    Creatinine 0 79 07/01/2021 08:39 AM    Creatinine 0 88 10/08/2020 08:30 AM    AST 10 07/01/2021 08:39 AM    AST 14 10/08/2020 08:30 AM    ALT 20 07/01/2021 08:39 AM    ALT 21 10/08/2020 08:30 AM    Albumin 3 5 07/01/2021 08:39 AM    Albumin 4 0 10/08/2020 08:30 AM    HDL, Direct 41 07/01/2021 08:39 AM    HDL, Direct 46 10/08/2020 08:30 AM    Triglycerides 94 07/01/2021 08:39 AM    Triglycerides 82 10/08/2020 08:30 AM           Imaging Studies: I have personally reviewed pertinent reports  Portions of the record may have been created with voice recognition software   Occasional wrong word or "sound a like" substitutions may have occurred due to the inherent limitations of voice recognition software  Read the chart carefully and recognize, using context, where substitutions have occurred  Pending swallow evaluation

## 2021-08-10 ENCOUNTER — TELEPHONE (OUTPATIENT)
Dept: ENDOCRINOLOGY | Facility: CLINIC | Age: 56
End: 2021-08-10

## 2021-08-16 ENCOUNTER — OFFICE VISIT (OUTPATIENT)
Dept: DIABETES SERVICES | Facility: CLINIC | Age: 56
End: 2021-08-16
Payer: COMMERCIAL

## 2021-08-16 VITALS — BODY MASS INDEX: 27.86 KG/M2 | WEIGHT: 205.4 LBS

## 2021-08-16 DIAGNOSIS — E11.9 TYPE 2 DIABETES MELLITUS WITHOUT COMPLICATION, WITHOUT LONG-TERM CURRENT USE OF INSULIN (HCC): Primary | ICD-10-CM

## 2021-08-16 PROCEDURE — 97802 MEDICAL NUTRITION INDIV IN: CPT | Performed by: DIETITIAN, REGISTERED

## 2021-08-16 NOTE — PROGRESS NOTES
Medical Nutrition Therapy        Assessment    Visit Type: Initial visit  Chief complaint/Medical Diagnosis/reason for visit E11 9 (Y7VN without complications, without insulin)    CIERRA West was seen today for his initial MNT visit Ken West reports an unplanned weight loss of 20 pounds in the past 1 5 years  He has made significant changes to his diet since being diagnosed with T2DM  Dietary changes include following a low carb Paleo diet with no junk food or sweetened beverages  Ken West now consumes breakfast daily which he historically skipped  Problems identified in food recall include inconsistent carbohydrate intake at meals and poorly timed meals  Provided patient with guidelines for a 2000 calorie meal plan to assist with consistency, balance and portion control  Encouraged the consumption of regular meals at regular times about 4-5 hours apart  Advised patient to keep carbohydrate intake to 45-60 grams per meal and 15 grams per snack to assist with glycemic control  Suggested keeping protein intake to 10 ounces a day and fat to 5 servings daily to assist with lipid management and calorie control  Portion booklet and food labels were used to teach basic carbohydrate counting  Patient agreed to keep daily food log  Follow-up was scheduled in 6 weeks  RD will remain available for further dietary questions/concerns  Ht Readings from Last 1 Encounters:   07/20/21 6' (1 829 m)     Wt Readings from Last 3 Encounters:   08/16/21 93 2 kg (205 lb 6 4 oz)   07/20/21 93 9 kg (207 lb)   07/09/21 94 7 kg (208 lb 12 8 oz)     Weight Change: Yes 20 pound unplannerd weight loss in about 1 5 years      Barriers to Learning: no barriers    Do you follow any special diet presently?: Yes - low carb diet and no junk food  Or sweetened beverages  Who shops: patient and spouse  Who cooks: patient and spouse    Food Log: Completed via the method of food recall    Breakfast:9:00-9:30AM 1 cup scrambled eggs, coffee with light cream and 1 packet sugar or 1 cup plain oatmeal, coffee or 1 cup Vanilla or plain yogurt with granola, coffee  Morning Snack:none  Lunch:11:00-12:00PM 1 cup soup and ham and cheese sandwich on WG bread OR steak sandwich on 1/2 a roll or no roll; occasionally a bag of chips; diet soda  Afternoon Snack: rare; lately, may have yazmin crackers and PB  Dinner:6:30-8:00PM butter chicken with a tomato base sauce over 1 cup quinoa OR protein and veggies and no starch; water or unsweetened iced tea  Evening Snack:none; occasionally a bowl of corn flakes and a banana  Beverages: water, coffee, diet soda or unsweet tea  Eating out/Take out:once a week if that  Exercise 1-2 miles walk or 30 minutes on treadmill/eliptical machine 4-5 days a week    Calorie needs 2000 kcals/day Carbs: 45-60g/meal, 15g/snack     Fat: 5 servings/day    Protein:10 servings/day    Nutrition Diagnosis:  Food and nutrition related knowledge deficit  related to Lack of prior exposure to accurate nutrition related information as evidenced by No prior knowledge of need for food and nutrition related recommendations    Intervention: label reading, behavior modification strategies, carbohydrate counting, meal timing, meal planning, individualized meal plan, exercise guidelines and food diary     Treatment Goals: Patient will consume 3 meals a day and Patient will count carbohydrates    Monitoring and evaluation:    Term code indicator  FH 1 3 2 Food Intake Criteria: Consume 3 meals a day 4-5 hours apart  Term code indicator  FH 1 6 3 Carbohydrate Intake Criteria: 45-60 grams per meal and no more than 15 grams per snack      Materials Provided: Portion booklet and food logs    Patients Response to Instruction:  Ni Grant  Expected Compliancegood    Start- Stop: 7:15-8:15  Total Minutes: 61 Minutes  Group or Individual Instruction: MNT-I  Other: Dr Melissa Butcher    Thank you for coming to the [facility] education today  Please feel free to call with any questions or concerns      Sandor Williamson  [address] [facility] [id number]

## 2021-08-16 NOTE — PATIENT INSTRUCTIONS
1  Consume 3 meals a day 4-5 hours apart  2  45-60 grams per meal and no more than 15 grams per snack

## 2021-09-01 LAB
LEFT EYE DIABETIC RETINOPATHY: NORMAL
RIGHT EYE DIABETIC RETINOPATHY: NORMAL

## 2021-09-14 ENCOUNTER — TELEPHONE (OUTPATIENT)
Dept: ENDOCRINOLOGY | Facility: CLINIC | Age: 56
End: 2021-09-14

## 2021-09-14 DIAGNOSIS — E11.9 TYPE 2 DIABETES MELLITUS WITHOUT COMPLICATION, WITHOUT LONG-TERM CURRENT USE OF INSULIN (HCC): Primary | ICD-10-CM

## 2021-09-14 RX ORDER — EMPAGLIFLOZIN 25 MG/1
25 TABLET, FILM COATED ORAL EVERY MORNING
Qty: 90 TABLET | Refills: 1 | Status: SHIPPED | OUTPATIENT
Start: 2021-09-14 | End: 2022-03-27 | Stop reason: SDUPTHER

## 2021-09-14 NOTE — TELEPHONE ENCOUNTER
Morning blood sugar is still above target  Increase Jardiance to 25 mg per day  Send blood sugar log in 2 to 3 weeks

## 2021-09-23 ENCOUNTER — OFFICE VISIT (OUTPATIENT)
Dept: ENDOCRINOLOGY | Facility: CLINIC | Age: 56
End: 2021-09-23
Payer: COMMERCIAL

## 2021-09-23 VITALS
HEIGHT: 72 IN | WEIGHT: 200.4 LBS | SYSTOLIC BLOOD PRESSURE: 120 MMHG | BODY MASS INDEX: 27.14 KG/M2 | HEART RATE: 102 BPM | DIASTOLIC BLOOD PRESSURE: 80 MMHG

## 2021-09-23 DIAGNOSIS — I10 ESSENTIAL HYPERTENSION: ICD-10-CM

## 2021-09-23 DIAGNOSIS — E78.49 OTHER HYPERLIPIDEMIA: ICD-10-CM

## 2021-09-23 DIAGNOSIS — E11.9 TYPE 2 DIABETES MELLITUS WITHOUT COMPLICATION, WITHOUT LONG-TERM CURRENT USE OF INSULIN (HCC): Primary | ICD-10-CM

## 2021-09-23 PROCEDURE — 99214 OFFICE O/P EST MOD 30 MIN: CPT | Performed by: NURSE PRACTITIONER

## 2021-09-23 NOTE — ASSESSMENT & PLAN NOTE
Jardiance increased last week to 25 mg  BG continue to improve per patient  He has f/u with diabetic dietician on 9/27 and will make an effort to start at the [facility]  He is due for repeat labs including A1C next week   For now as BG have been improving will continue current regimen

## 2021-09-23 NOTE — PROGRESS NOTES
Established Patient Progress Note      Chief Complaint   Patient presents with    Diabetes Type 2          History of Present Illness:   Sg Mcdonnell is a 55 y o  male with a history of HTN, HLD, and type 2 diabetes without long term use of insulin for 2 years  Reports no complications of diabetes  Last A1C 10 5  His Jardiance dose was increased to 25 mg last week to help with his morning BG  He reports his morning fasting is now in the 140s  Pre meal -120s  He did laura with dietician and feels he has a good handle on this  He is planning on getting to the [employer]  Denies recent illness or hospitalizations  Denies recent severe hypoglycemic or severe hyperglycemic episodes  Denies any issues with his current regimen  Home glucose monitoring: are performed regularly      Current regimen: Jardiance 25 mg and Metformin 1,000 mg BID  compliant all of the timedenies any side effects from current medications  Hypoglycemic episodes: No never   H/o of hypoglycemia causing hospitalization or Intervention such as glucagon injection or ambulance call No       Last Eye Exam: Dr Kimberlee Malave, no retinopathy, will request records   Last Foot Exam: 10/12/20    Has hypertension: Taking Valsartan   Has hyperlipidemia: not currently on statin       Patient Active Problem List   Diagnosis    Type 2 diabetes mellitus without complication, without long-term current use of insulin ([facility] [address] )    Essential hypertension    Other hyperlipidemia      History reviewed  No pertinent past medical history  Past Surgical History:   Procedure Laterality Date    KNEE SURGERY Left       Family History   Problem Relation Age of Onset    Diabetes type II Mother     Hypertension Mother     Diabetes type II Father     Colon cancer Father     Hypertension Father      Social History     Tobacco Use    Smoking status: Current Some Day Smoker    Smokeless tobacco: Never Used   Substance Use Topics    Alcohol use:  Yes Allergies   Allergen Reactions    Penicillin V Rash         Current Outpatient Medications:     Empagliflozin (Jardiance) 25 MG TABS, Take 1 tablet (25 mg total) by mouth every morning, Disp: 90 tablet, Rfl: 1    metFORMIN (GLUCOPHAGE) 1000 MG tablet, Take 1 tablet (1,000 mg total) by mouth 2 (two) times a day with meals, Disp: 180 tablet, Rfl: 3    valsartan (DIOVAN) 160 mg tablet, TAKE ONE TABLET BY MOUTH DAILY, Disp: 90 tablet, Rfl: 1    Review of Systems   Constitutional: Negative for activity change, appetite change and fatigue  HENT: Negative for sore throat, trouble swallowing and voice change  Eyes: Negative for visual disturbance  Respiratory: Negative for choking, chest tightness and shortness of breath  Cardiovascular: Negative for chest pain, palpitations and leg swelling  Gastrointestinal: Negative for abdominal pain, constipation and diarrhea  Endocrine: Negative for cold intolerance, heat intolerance, polydipsia, polyphagia and polyuria  Genitourinary: Negative for frequency  Musculoskeletal: Negative for arthralgias and myalgias  Skin: Negative for rash  Neurological: Negative for dizziness and syncope  Hematological: Negative for adenopathy  Psychiatric/Behavioral: Negative for sleep disturbance  All other systems reviewed and are negative  Physical Exam:  Body mass index is 27 18 kg/m²  /80   Pulse 102   Ht 6' (1 829 m)   Wt 90 9 kg (200 lb 6 4 oz)   BMI 27 18 kg/m²    Wt Readings from Last 3 Encounters:   09/23/21 90 9 kg (200 lb 6 4 oz)   08/16/21 93 2 kg (205 lb 6 4 oz)   07/20/21 93 9 kg (207 lb)       Physical Exam  Vitals reviewed  Constitutional:       General: He is not in acute distress  Appearance: He is well-developed  HENT:      Head: Normocephalic and atraumatic  Eyes:      Conjunctiva/sclera: Conjunctivae normal       Pupils: Pupils are equal, round, and reactive to light  Neck:      Thyroid: No thyromegaly  Cardiovascular:      Rate and Rhythm: Normal rate and regular rhythm  Heart sounds: Normal heart sounds  No murmur heard  Pulmonary:      Effort: Pulmonary effort is normal  No respiratory distress  Breath sounds: Normal breath sounds  No wheezing or rales  Abdominal:      General: Bowel sounds are normal  There is no distension  Palpations: Abdomen is soft  Tenderness: There is no abdominal tenderness  Musculoskeletal:         General: Normal range of motion  Cervical back: Normal range of motion and neck supple  Lymphadenopathy:      Cervical: No cervical adenopathy  Skin:     General: Skin is warm and dry  Neurological:      Mental Status: He is alert and oriented to person, place, and time  Labs:     Lab Results   Component Value Date    HGBA1C 10 5 (H) 07/01/2021       Lab Results   Component Value Date    SODIUM 136 07/01/2021    K 3 8 07/01/2021     07/01/2021    CO2 23 07/01/2021    AGAP 6 07/01/2021    BUN 14 07/01/2021    CREATININE 0 79 07/01/2021    GLUF 260 (H) 07/01/2021    CALCIUM 8 8 07/01/2021    AST 10 07/01/2021    ALT 20 07/01/2021    ALKPHOS 78 07/01/2021    TP 7 1 07/01/2021    TBILI 0 44 07/01/2021    EGFR 102 07/01/2021         Lab Results   Component Value Date    HDL 41 07/01/2021    TRIG 94 07/01/2021       Lab Results   Component Value Date    [id number] 1 510 07/01/2021    BIP4VACTMFTI 1 060 01/20/2020         Impression & Plan:    Problem List Items Addressed This Visit        Endocrine    Type 2 diabetes mellitus without complication, without long-term current use of insulin ([address] ) - Primary     Jardiance increased last week to 25 mg  BG continue to improve per patient  He has f/u with diabetic dietician on 9/27 and will make an effort to start at the [employer] [facility] [place]  He is due for repeat labs including A1C next week   For now as BG have been improving will continue current regimen          Relevant Orders    Hemoglobin A1C    Comprehensive metabolic panel    Lipid Panel with Direct LDL reflex    Microalbumin / creatinine urine ratio       Cardiovascular and Mediastinum    Essential hypertension     BP stable, continue current regimen          Relevant Orders    Comprehensive metabolic panel       Other    Other hyperlipidemia     Check fasting lipid panel          Relevant Orders    Lipid Panel with Direct LDL reflex          Orders Placed This Encounter   Procedures    Hemoglobin A1C    Comprehensive metabolic panel     This is a patient instruction: Patient fasting for 8 hours or longer recommended   Lipid Panel with Direct LDL reflex     This is a patient instruction: This test requires patient fasting for 10-12 hours or longer  Drinking of black coffee or black tea is acceptable   Microalbumin / creatinine urine ratio         Discussed with the patient and all questioned fully answered  He will call me if any problems arise  Follow-up appointment in 3 months       Counseled patient on diagnostic results, prognosis, risk and benefit of treatment options, instruction for management, importance of treatment compliance, Risk  factor reduction and impressions      Pérez Nance [address]

## 2021-10-04 ENCOUNTER — VBI (OUTPATIENT)
Dept: ADMINISTRATIVE | Facility: OTHER | Age: 56
End: 2021-10-04

## 2021-10-08 DIAGNOSIS — E11.9 TYPE 2 DIABETES MELLITUS WITHOUT COMPLICATION, WITHOUT LONG-TERM CURRENT USE OF INSULIN (HCC): ICD-10-CM

## 2021-10-08 DIAGNOSIS — I10 ESSENTIAL HYPERTENSION: ICD-10-CM

## 2021-10-08 RX ORDER — VALSARTAN 160 MG/1
TABLET ORAL
Qty: 90 TABLET | Refills: 0 | Status: SHIPPED | OUTPATIENT
Start: 2021-10-08 | End: 2022-01-27 | Stop reason: ALTCHOICE

## 2021-10-14 ENCOUNTER — APPOINTMENT (OUTPATIENT)
Dept: LAB | Facility: HOSPITAL | Age: 56
End: 2021-10-14
Payer: COMMERCIAL

## 2021-10-14 LAB
ALBUMIN SERPL BCP-MCNC: 3.8 G/DL (ref 3.5–5)
ALP SERPL-CCNC: 67 U/L (ref 46–116)
ALT SERPL W P-5'-P-CCNC: 22 U/L (ref 12–78)
ANION GAP SERPL CALCULATED.3IONS-SCNC: 3 MMOL/L (ref 4–13)
AST SERPL W P-5'-P-CCNC: 7 U/L (ref 5–45)
BILIRUB SERPL-MCNC: 0.71 MG/DL (ref 0.2–1)
BUN SERPL-MCNC: 19 MG/DL (ref 5–25)
CALCIUM SERPL-MCNC: 9.4 MG/DL (ref 8.3–10.1)
CHLORIDE SERPL-SCNC: 109 MMOL/L (ref 100–108)
CHOLEST SERPL-MCNC: 219 MG/DL (ref 50–200)
CO2 SERPL-SCNC: 26 MMOL/L (ref 21–32)
CREAT SERPL-MCNC: 0.78 MG/DL (ref 0.6–1.3)
CREAT UR-MCNC: 96.6 MG/DL
EST. AVERAGE GLUCOSE BLD GHB EST-MCNC: 174 MG/DL
GFR SERPL CREATININE-BSD FRML MDRD: 102 ML/MIN/1.73SQ M
GLUCOSE P FAST SERPL-MCNC: 167 MG/DL (ref 65–99)
HBA1C MFR BLD: 7.7 %
HDLC SERPL-MCNC: 46 MG/DL
LDLC SERPL CALC-MCNC: 158 MG/DL (ref 0–100)
MICROALBUMIN UR-MCNC: 6.4 MG/L (ref 0–20)
MICROALBUMIN/CREAT 24H UR: 7 MG/G CREATININE (ref 0–30)
POTASSIUM SERPL-SCNC: 4.4 MMOL/L (ref 3.5–5.3)
PROT SERPL-MCNC: 7.3 G/DL (ref 6.4–8.2)
SODIUM SERPL-SCNC: 138 MMOL/L (ref 136–145)
TRIGL SERPL-MCNC: 77 MG/DL

## 2021-10-14 PROCEDURE — 80061 LIPID PANEL: CPT | Performed by: NURSE PRACTITIONER

## 2021-10-14 PROCEDURE — 82570 ASSAY OF URINE CREATININE: CPT | Performed by: NURSE PRACTITIONER

## 2021-10-14 PROCEDURE — 82043 UR ALBUMIN QUANTITATIVE: CPT | Performed by: NURSE PRACTITIONER

## 2021-10-14 PROCEDURE — 83036 HEMOGLOBIN GLYCOSYLATED A1C: CPT | Performed by: NURSE PRACTITIONER

## 2021-10-14 PROCEDURE — 80053 COMPREHEN METABOLIC PANEL: CPT | Performed by: NURSE PRACTITIONER

## 2021-10-14 PROCEDURE — 36415 COLL VENOUS BLD VENIPUNCTURE: CPT | Performed by: NURSE PRACTITIONER

## 2021-12-09 ENCOUNTER — IMMUNIZATIONS (OUTPATIENT)
Dept: FAMILY MEDICINE CLINIC | Facility: HOSPITAL | Age: 56
End: 2021-12-09

## 2021-12-09 DIAGNOSIS — Z23 ENCOUNTER FOR IMMUNIZATION: Primary | ICD-10-CM

## 2021-12-09 PROCEDURE — 0001A COVID-19 PFIZER VACC 0.3 ML: CPT

## 2021-12-09 PROCEDURE — 91300 COVID-19 PFIZER VACC 0.3 ML: CPT

## 2022-01-06 ENCOUNTER — OFFICE VISIT (OUTPATIENT)
Dept: ENDOCRINOLOGY | Facility: CLINIC | Age: 57
End: 2022-01-06
Payer: COMMERCIAL

## 2022-01-06 VITALS
SYSTOLIC BLOOD PRESSURE: 118 MMHG | WEIGHT: 203.6 LBS | HEIGHT: 72 IN | DIASTOLIC BLOOD PRESSURE: 82 MMHG | HEART RATE: 84 BPM | BODY MASS INDEX: 27.58 KG/M2

## 2022-01-06 DIAGNOSIS — K59.00 CONSTIPATION, UNSPECIFIED CONSTIPATION TYPE: ICD-10-CM

## 2022-01-06 DIAGNOSIS — E11.9 TYPE 2 DIABETES MELLITUS WITHOUT COMPLICATION, WITHOUT LONG-TERM CURRENT USE OF INSULIN (HCC): Primary | ICD-10-CM

## 2022-01-06 DIAGNOSIS — E78.49 OTHER HYPERLIPIDEMIA: ICD-10-CM

## 2022-01-06 PROCEDURE — 99214 OFFICE O/P EST MOD 30 MIN: CPT | Performed by: INTERNAL MEDICINE

## 2022-01-06 NOTE — PROGRESS NOTES
Follow-up Patient Progress Note      CC: Type 2 diabetes    History of Present Illness:   55 yr male with type 2 diabetes for 3 years, obesity, HTN, HLD and sleep disturbance  Since last visit, he is doing well  He lost 30 lbs since seeing endocrinology with diet, lifestyle changes and medications  A1c improved from 10 5 to 7 7%  He reports constipation and nocturia  Home blood glucose monitoring: reports morning hyperglycemia upto 150mg/dL  Before breakfast: 100-150mg/dL  Before lunch:   Before dinner: 90-180mg/dL  Bedtime:   Hypoglycemia:NO    Current meds:  Metformin 1000mg PO BID  Jardiance 25mg PO daily    Opthamology: yes- no retinopathy  Podiatry: Yes  vaccination: Yes  Dental:  Pancreatitis: No    Ace/ARB: valsartan  Statin: No  Thyroid issues: No    Patient Active Problem List   Diagnosis    Type 2 diabetes mellitus without complication, without long-term current use of insulin [facility]    Essential hypertension    Other hyperlipidemia     History reviewed  No pertinent past medical history  Past Surgical History:   Procedure Laterality Date    KNEE SURGERY Left       Family History   Problem Relation Age of Onset    Diabetes type II Mother     Hypertension Mother     Diabetes type II Father     Colon cancer Father     Hypertension Father      Social History     Tobacco Use    Smoking status: Current Some Day Smoker    Smokeless tobacco: Never Used   Substance Use Topics    Alcohol use:  Yes     Allergies   Allergen Reactions    Penicillin V Rash         Current Outpatient Medications:     Empagliflozin (Jardiance) 25 MG TABS, Take 1 tablet (25 mg total) by mouth every morning, Disp: 90 tablet, Rfl: 1    metFORMIN (GLUCOPHAGE) 1000 MG tablet, TAKE 1 TABLET BY MOUTH TWICE DAILY WITH MEALS, Disp: 180 tablet, Rfl: 0    valsartan (DIOVAN) 160 mg tablet, TAKE ONE TABLET BY MOUTH DAILY, Disp: 90 tablet, Rfl: 0    Review of Systems   Constitutional: Positive for activity change, appetite change and fatigue  HENT: Negative  Eyes: Negative  Respiratory: Negative  Cardiovascular: Negative for chest pain  Gastrointestinal: Negative  Endocrine: Negative  Genitourinary: Negative  Musculoskeletal: Negative  Skin: Negative  Allergic/Immunologic: Negative  Neurological: Negative  Hematological: Negative  Psychiatric/Behavioral: Negative  All other systems reviewed and are negative  Physical Exam:  Body mass index is 27 61 kg/m²  /82   Pulse 84   Ht 6' (1 829 m)   Wt 92 4 kg (203 lb 9 6 oz)   BMI 27 61 kg/m²    Vitals:    01/06/22 0823   Weight: 92 4 kg (203 lb 9 6 oz)        Physical Exam  Constitutional:       Appearance: He is well-developed  HENT:      Head: Normocephalic  Eyes:      Pupils: Pupils are equal, round, and reactive to light  Neck:      Thyroid: No thyromegaly  Cardiovascular:      Rate and Rhythm: Normal rate  Heart sounds: Normal heart sounds  Pulmonary:      Effort: Pulmonary effort is normal       Breath sounds: Normal breath sounds  Abdominal:      General: Bowel sounds are normal       Palpations: Abdomen is soft  Musculoskeletal:         General: No deformity  Cervical back: Normal range of motion  Skin:     Capillary Refill: Capillary refill takes less than 2 seconds  Coloration: Skin is not pale  Findings: No rash  Neurological:      Mental Status: He is alert and oriented to person, place, and time           Labs:   Lab Results   Component Value Date    HGBA1C 7 7 (H) 10/14/2021       Lab Results   Component Value Date    [id number] 1 510 07/01/2021       Lab Results   Component Value Date    CREATININE 0 78 10/14/2021    CREATININE 0 79 07/01/2021    CREATININE 0 88 10/08/2020    BUN 19 10/14/2021    K 4 4 10/14/2021     (H) 10/14/2021    CO2 26 10/14/2021     eGFR   Date Value Ref Range Status   10/14/2021 102 ml/min/1 73sq m Final       Lab Results   Component Value Date    ALT 22 10/14/2021    AST 7 10/14/2021    ALKPHOS 67 10/14/2021       Lab Results   Component Value Date    CHOLESTEROL 219 (H) 10/14/2021    CHOLESTEROL 184 07/01/2021    CHOLESTEROL 210 (H) 10/08/2020     Lab Results   Component Value Date    HDL 46 10/14/2021    HDL 41 07/01/2021    HDL 46 10/08/2020     Lab Results   Component Value Date    TRIG 77 10/14/2021    TRIG 94 07/01/2021    TRIG 82 10/08/2020     Lab Results   Component Value Date    NONHDLC 143 07/01/2021    [place] 164 10/08/2020    [place] 150 04/21/2020         Impression:  1  Type 2 diabetes mellitus without complication, without long-term current use of insulin ([facility] [address] )    2  Other hyperlipidemia    3  Constipation, unspecified constipation type         Plan:    Diagnoses and all orders for this visit:    Type 2 diabetes mellitus without complication, without long-term current use of insulin ([facility] [address] )  He is uncontrolled but improving  Last A1c 7 7% in 10/2021  Will expect next A1c will be better based on reported glucose logs  Today we discussed in detail about diabetes pathophysiology, corina phenomenon, effects of sleep disturbance/stress, SAGM, diet, intermittent fasting and benefits/risks, other lifestyle modifications, medical fitness training, diabetes education, goals of therapy, follow up needs and medications including insulin, metformin, Jardiance and GLP1 agonists  Advised to maintain goal blood sugars 70-180mg/dL  Aim for a fasting glucose <120mg/dL  One of the barriers of improved glycemia is prolonged work hours leading to late dinner  Advised to send logs in 6 weeks  Advised to continue Metformin 1000mg PO BID and Jardiance 25mg qdaily  In future, will consider a GLP1 agonist if needed  Follow up in 3 months     -     Hemoglobin A1C; Future    Other hyperlipidemia  He has high  and non  range   Will probably need a statin for primary prevention but will wait for repeat lipid profile as he has made diet and lifestyle changes  Constipation, unspecified constipation type  Reports new constipation  Also has plateaued with weight  Advised more fresh vegetables in diet for roughage  Will rule out hypothyroidism   -     T4, free; Future  -     TSH, 3rd generation; Future        I have spent 35 minutes with patient today in which greater than 50% of this time was spent in counseling/coordination of care  Discussed with the patient and all questioned fully answered  He will call me if any problems arise  Educated/ Counseled patient on diagnostic test results, prognosis, risk vs benefit of treatment options, importance of treatment compliance, healthy life and lifestyle choices        [address]

## 2022-01-18 DIAGNOSIS — E11.9 TYPE 2 DIABETES MELLITUS WITHOUT COMPLICATION, WITHOUT LONG-TERM CURRENT USE OF INSULIN (HCC): ICD-10-CM

## 2022-01-27 DIAGNOSIS — I10 ESSENTIAL HYPERTENSION: ICD-10-CM

## 2022-01-27 RX ORDER — VALSARTAN 80 MG/1
80 TABLET ORAL DAILY
Qty: 60 TABLET | Refills: 0 | Status: SHIPPED | OUTPATIENT
Start: 2022-01-27

## 2022-01-28 ENCOUNTER — LAB (OUTPATIENT)
Dept: LAB | Facility: HOSPITAL | Age: 57
End: 2022-01-28
Attending: INTERNAL MEDICINE
Payer: COMMERCIAL

## 2022-01-28 DIAGNOSIS — K59.00 CONSTIPATION, UNSPECIFIED CONSTIPATION TYPE: ICD-10-CM

## 2022-01-28 DIAGNOSIS — E11.9 TYPE 2 DIABETES MELLITUS WITHOUT COMPLICATION, WITHOUT LONG-TERM CURRENT USE OF INSULIN (HCC): ICD-10-CM

## 2022-01-28 LAB
T4 FREE SERPL-MCNC: 1.09 NG/DL (ref 0.76–1.46)
TSH SERPL DL<=0.05 MIU/L-ACNC: 0.65 UIU/ML (ref 0.36–3.74)

## 2022-01-28 PROCEDURE — 83036 HEMOGLOBIN GLYCOSYLATED A1C: CPT

## 2022-01-28 PROCEDURE — 84443 ASSAY THYROID STIM HORMONE: CPT

## 2022-01-28 PROCEDURE — 36415 COLL VENOUS BLD VENIPUNCTURE: CPT

## 2022-01-28 PROCEDURE — 84439 ASSAY OF FREE THYROXINE: CPT

## 2022-01-29 LAB
EST. AVERAGE GLUCOSE BLD GHB EST-MCNC: 183 MG/DL
HBA1C MFR BLD: 8 %

## 2022-02-15 DIAGNOSIS — E11.9 TYPE 2 DIABETES MELLITUS WITHOUT COMPLICATION, WITHOUT LONG-TERM CURRENT USE OF INSULIN (HCC): Primary | ICD-10-CM

## 2022-03-27 DIAGNOSIS — E11.9 TYPE 2 DIABETES MELLITUS WITHOUT COMPLICATION, WITHOUT LONG-TERM CURRENT USE OF INSULIN (HCC): ICD-10-CM

## 2022-04-06 ENCOUNTER — OFFICE VISIT (OUTPATIENT)
Dept: ENDOCRINOLOGY | Facility: CLINIC | Age: 57
End: 2022-04-06
Payer: COMMERCIAL

## 2022-04-06 VITALS
BODY MASS INDEX: 27.36 KG/M2 | HEIGHT: 72 IN | DIASTOLIC BLOOD PRESSURE: 90 MMHG | HEART RATE: 102 BPM | SYSTOLIC BLOOD PRESSURE: 126 MMHG | WEIGHT: 202 LBS

## 2022-04-06 DIAGNOSIS — E11.9 TYPE 2 DIABETES MELLITUS WITHOUT COMPLICATION, WITHOUT LONG-TERM CURRENT USE OF INSULIN (HCC): Primary | ICD-10-CM

## 2022-04-06 DIAGNOSIS — E78.49 OTHER HYPERLIPIDEMIA: ICD-10-CM

## 2022-04-06 PROCEDURE — 99214 OFFICE O/P EST MOD 30 MIN: CPT | Performed by: INTERNAL MEDICINE

## 2022-04-06 RX ORDER — DULAGLUTIDE 1.5 MG/.5ML
1.5 INJECTION, SOLUTION SUBCUTANEOUS WEEKLY
Qty: 6 ML | Refills: 1 | Status: SHIPPED | OUTPATIENT
Start: 2022-04-06 | End: 2022-07-12 | Stop reason: SDUPTHER

## 2022-04-06 NOTE — PROGRESS NOTES
Follow-up Patient Progress Note      CC: type 2 diabetes    History of Present Illness:   55 yr male with type 2 diabetes for 3 years, obesity, HTN, HLD and sleep disturbance  Last visit was 1/6/22      Since last visit,weight has remained steady  He is concerned about fasting hyperglycemia, constipation since starting jardiance and some nocturia  He reports overall feeling well and asymptomatic      Home blood glucose monitoring: reports morning hyperglycemia upto 140mg/dL  Before breakfast: 100-150mg/dL  Before lunch:   Before dinner: 90-180mg/dL  Bedtime:   Hypoglycemia:No     Current meds:  Metformin 1000mg PO BID  Jardiance 11JO PO daily  Trulicity 9 06SU weekly     Opthamology: yes- no retinopathy  Podiatry: No  Last foot exam 10/21  vaccination: Yes  Dental:  Pancreatitis: No     Ace/ARB: valsartan  Statin: No  Thyroid issues: No    Patient Active Problem List   Diagnosis    Type 2 diabetes mellitus without complication, without long-term current use of insulin ([address] )    Essential hypertension    Other hyperlipidemia     History reviewed  No pertinent past medical history  Past Surgical History:   Procedure Laterality Date    KNEE SURGERY Left       Family History   Problem Relation Age of Onset    Diabetes type II Mother     Hypertension Mother     Diabetes type II Father     Colon cancer Father     Hypertension Father      Social History     Tobacco Use    Smoking status: Current Some Day Smoker    Smokeless tobacco: Never Used   Substance Use Topics    Alcohol use:  Yes     Allergies   Allergen Reactions    Penicillin V Rash         Current Outpatient Medications:     Dulaglutide 0 75 MG/0 5ML SOPN, Inject 0 5 mL (0 75 mg total) under the skin once a week, Disp: 6 mL, Rfl: 0    Empagliflozin (Jardiance) 25 MG TABS, Take 1 tablet (25 mg total) by mouth every morning, Disp: 90 tablet, Rfl: 0    metFORMIN (GLUCOPHAGE) 1000 MG tablet, Take 1 tablet (1,000 mg total) by mouth 2 (two) times a day with meals, Disp: 180 tablet, Rfl: 0    valsartan (DIOVAN) 80 mg tablet, Take 1 tablet (80 mg total) by mouth daily, Disp: 60 tablet, Rfl: 0    Review of Systems   Constitutional: Positive for activity change, appetite change and fatigue  HENT: Negative  Eyes: Negative  Respiratory: Negative  Cardiovascular: Negative for chest pain  Gastrointestinal: Negative  Endocrine: Negative  Genitourinary: Negative  Musculoskeletal: Negative  Skin: Negative  Allergic/Immunologic: Negative  Neurological: Negative  Hematological: Negative  Psychiatric/Behavioral: Negative  All other systems reviewed and are negative  Physical Exam:  Body mass index is 27 4 kg/m²  /90   Pulse 102   Ht 6' (1 829 m)   Wt 91 6 kg (202 lb)   BMI 27 40 kg/m²    Vitals:    04/06/22 0818   Weight: 91 6 kg (202 lb)        Physical Exam  Constitutional:       Appearance: He is well-developed  HENT:      Head: Normocephalic  Eyes:      Pupils: Pupils are equal, round, and reactive to light  Neck:      Thyroid: No thyromegaly  Cardiovascular:      Rate and Rhythm: Normal rate  Heart sounds: Normal heart sounds  Pulmonary:      Effort: Pulmonary effort is normal       Breath sounds: Normal breath sounds  Abdominal:      General: Bowel sounds are normal       Palpations: Abdomen is soft  Musculoskeletal:         General: No deformity  Cervical back: Normal range of motion  Skin:     Capillary Refill: Capillary refill takes less than 2 seconds  Coloration: Skin is not pale  Findings: No rash  Neurological:      Mental Status: He is alert and oriented to person, place, and time           Labs:   Lab Results   Component Value Date    HGBA1C 8 0 (H) 01/28/2022       Lab Results   Component Value Date    TTF2GAHKKDDI 0 648 01/28/2022       Lab Results   Component Value Date    CREATININE 0 78 10/14/2021    CREATININE 0 79 07/01/2021    CREATININE 0 88 10/08/2020 BUN 19 10/14/2021    K 4 4 10/14/2021     (H) 10/14/2021    CO2 26 10/14/2021     eGFR   Date Value Ref Range Status   10/14/2021 102 ml/min/1 73sq m Final       Lab Results   Component Value Date    ALT 22 10/14/2021    AST 7 10/14/2021    ALKPHOS 67 10/14/2021       Lab Results   Component Value Date    CHOLESTEROL 219 (H) 10/14/2021    CHOLESTEROL 184 07/01/2021    CHOLESTEROL 210 (H) 10/08/2020     Lab Results   Component Value Date    HDL 46 10/14/2021    HDL 41 07/01/2021    HDL 46 10/08/2020     Lab Results   Component Value Date    TRIG 77 10/14/2021    TRIG 94 07/01/2021    TRIG 82 10/08/2020     Lab Results   Component Value Date    NONHDLC 143 07/01/2021    [place] 164 10/08/2020    [place] 150 04/21/2020         Impression:  1  Type 2 diabetes mellitus without complication, without long-term current use of insulin ([address] )    2  Other hyperlipidemia         Plan:    Diagnoses and all orders for this visit:    Type 2 diabetes mellitus without complication, without long-term current use of insulin ([facility] [address] )  He is generally controlled with acceptable capillary glucose as reported  A1c goal will be 7%  Today we discussed options and I advised 64oz fluid intake to help constipation as Jardiance can cause dehydration  If this is not helpful in a few weeks, advised to reduce dose to 10mg qdaily  For now, take metformin 1g po bid, dulaglutide 1 5mg weekly and jardiance 25mg qdaily  Reiterated advised about muscle building/resistance training exercise  Follow up in 3 months  -     Empagliflozin (Jardiance) 10 MG TABS; Take 1 tablet (10 mg total) by mouth every morning  -     Dulaglutide (Trulicity) 1 5 JI/4 7ZR SOPN; Inject 0 5 mL (1 5 mg total) under the skin once a week    Other hyperlipidemia        I have spent 35 minutes with patient today in which greater than 50% of this time was spent in counseling/coordination of care  Discussed with the patient and all questioned fully answered   He will call me if any problems arise  Educated/ Counseled patient on diagnostic test results, prognosis, risk vs benefit of treatment options, importance of treatment compliance, healthy life and lifestyle choices        [address]

## 2022-04-25 ENCOUNTER — TELEPHONE (OUTPATIENT)
Dept: ENDOCRINOLOGY | Facility: CLINIC | Age: 57
End: 2022-04-25

## 2022-04-25 NOTE — TELEPHONE ENCOUNTER
----- Message from Aguila Allen sent at 4/25/2022 12:47 PM EDT -----  Regarding: A1C  Good afternoon,  I am scheduled for a follow up on 5/20  I am scheduled for A1C but was not given any orders for lab work prior to next appointment  Please send orders to [facility]    Thanks,  Tanya Andujar

## 2022-04-28 DIAGNOSIS — E11.9 TYPE 2 DIABETES MELLITUS WITHOUT COMPLICATION, WITHOUT LONG-TERM CURRENT USE OF INSULIN (HCC): Primary | ICD-10-CM

## 2022-04-28 DIAGNOSIS — E78.2 MIXED HYPERLIPIDEMIA: ICD-10-CM

## 2022-05-20 ENCOUNTER — OFFICE VISIT (OUTPATIENT)
Dept: ENDOCRINOLOGY | Facility: CLINIC | Age: 57
End: 2022-05-20
Payer: COMMERCIAL

## 2022-05-20 VITALS
HEART RATE: 100 BPM | BODY MASS INDEX: 26.66 KG/M2 | SYSTOLIC BLOOD PRESSURE: 128 MMHG | HEIGHT: 72 IN | DIASTOLIC BLOOD PRESSURE: 82 MMHG | WEIGHT: 196.8 LBS

## 2022-05-20 DIAGNOSIS — E66.3 OVERWEIGHT (BMI 25.0-29.9): ICD-10-CM

## 2022-05-20 DIAGNOSIS — E11.9 TYPE 2 DIABETES MELLITUS WITHOUT COMPLICATION, WITHOUT LONG-TERM CURRENT USE OF INSULIN (HCC): Primary | ICD-10-CM

## 2022-05-20 DIAGNOSIS — E78.49 OTHER HYPERLIPIDEMIA: ICD-10-CM

## 2022-05-20 PROCEDURE — 99214 OFFICE O/P EST MOD 30 MIN: CPT | Performed by: INTERNAL MEDICINE

## 2022-05-20 NOTE — PROGRESS NOTES
Follow-up Patient Progress Note      CC: type 2 diabetes     History of Present Illness:   55 yr male with type 2 diabetes for 3 years, obesity, HTN, HLD and sleep disturbance  Last visit was 4/6/22      Since last visit, he has lost 6 lbs  He is concerned about fasting hyperglycemia and nocturia that disrrupts his sleep  Constipation improved since starting increased oral fluid intake  Otherwise improved energy and feels well      Home blood glucose monitoring: reports morning hyperglycemia upto 140mg/dL    Before breakfast: 100-160mg/dL  Before lunch:   Before dinner: 90-150mg/dL  Bedtime:   Hypoglycemia:No     Current meds:  Metformin 1000mg PO BID  Jardiance 87ZP PO daily  Trulicity 7 3OW weekly     Opthamology: yes- no retinopathy  Podiatry: No  Last foot exam 10/21  vaccination: Yes  Dental:  Pancreatitis: No     Ace/ARB: valsartan  Statin: No  Thyroid issues: No    Patient Active Problem List   Diagnosis    Type 2 diabetes mellitus without complication, without long-term current use of insulin ([address] )    Essential hypertension    Other hyperlipidemia     History reviewed  No pertinent past medical history  Past Surgical History:   Procedure Laterality Date    KNEE SURGERY Left       Family History   Problem Relation Age of Onset    Diabetes type II Mother     Hypertension Mother     Diabetes type II Father     Colon cancer Father     Hypertension Father      Social History     Tobacco Use    Smoking status: Current Some Day Smoker    Smokeless tobacco: Never Used   Substance Use Topics    Alcohol use:  Yes     Allergies   Allergen Reactions    Penicillin V Rash         Current Outpatient Medications:     Dulaglutide (Trulicity) 1 5 YVETTE/6 9JF SOPN, Inject 0 5 mL (1 5 mg total) under the skin once a week, Disp: 6 mL, Rfl: 1    Empagliflozin (Jardiance) 10 MG TABS, Take 1 tablet (10 mg total) by mouth every morning, Disp: 30 tablet, Rfl: 0    metFORMIN (GLUCOPHAGE) 1000 MG tablet, Take 1 tablet (1,000 mg total) by mouth 2 (two) times a day with meals, Disp: 180 tablet, Rfl: 0    Empagliflozin (Jardiance) 25 MG TABS, Take 1 tablet (25 mg total) by mouth every morning (Patient not taking: Reported on 5/20/2022), Disp: 90 tablet, Rfl: 0    valsartan (DIOVAN) 80 mg tablet, Take 1 tablet (80 mg total) by mouth daily (Patient not taking: Reported on 5/20/2022), Disp: 60 tablet, Rfl: 0    Review of Systems   Constitutional: Positive for activity change, appetite change and fatigue  HENT: Negative  Eyes: Negative  Respiratory: Negative  Cardiovascular: Negative for chest pain  Gastrointestinal: Negative  Endocrine: Negative  Genitourinary: Negative  Musculoskeletal: Negative  Skin: Negative  Allergic/Immunologic: Negative  Neurological: Negative  Hematological: Negative  Psychiatric/Behavioral: Negative  All other systems reviewed and are negative  Physical Exam:  Body mass index is 26 69 kg/m²  /82 (BP Location: Left arm, Patient Position: Sitting, Cuff Size: Standard)   Pulse 100   Ht 6' (1 829 m)   Wt 89 3 kg (196 lb 12 8 oz)   BMI 26 69 kg/m²    Vitals:    05/20/22 0833   Weight: 89 3 kg (196 lb 12 8 oz)        Physical Exam  Constitutional:       Appearance: He is well-developed  HENT:      Head: Normocephalic  Eyes:      Pupils: Pupils are equal, round, and reactive to light  Neck:      Thyroid: No thyromegaly  Cardiovascular:      Rate and Rhythm: Normal rate  Heart sounds: Normal heart sounds  Pulmonary:      Effort: Pulmonary effort is normal       Breath sounds: Normal breath sounds  Abdominal:      General: Bowel sounds are normal       Palpations: Abdomen is soft  Musculoskeletal:         General: No deformity  Cervical back: Normal range of motion  Skin:     Capillary Refill: Capillary refill takes less than 2 seconds  Coloration: Skin is not pale  Findings: No rash     Neurological:      Mental Status: He is alert and oriented to person, place, and time  Labs:   Lab Results   Component Value Date    HGBA1C 8 0 (H) 01/28/2022       Lab Results   Component Value Date    [id number] 0 648 01/28/2022       Lab Results   Component Value Date    CREATININE 0 78 10/14/2021    CREATININE 0 79 07/01/2021    CREATININE 0 88 10/08/2020    BUN 19 10/14/2021    K 4 4 10/14/2021     (H) 10/14/2021    CO2 26 10/14/2021     eGFR   Date Value Ref Range Status   10/14/2021 102 ml/min/1 73sq m Final       Lab Results   Component Value Date    ALT 22 10/14/2021    AST 7 10/14/2021    ALKPHOS 67 10/14/2021       Lab Results   Component Value Date    CHOLESTEROL 219 (H) 10/14/2021    CHOLESTEROL 184 07/01/2021    CHOLESTEROL 210 (H) 10/08/2020     Lab Results   Component Value Date    HDL 46 10/14/2021    HDL 41 07/01/2021    HDL 46 10/08/2020     Lab Results   Component Value Date    TRIG 77 10/14/2021    TRIG 94 07/01/2021    TRIG 82 10/08/2020     Lab Results   Component Value Date    NONHDLC 143 07/01/2021    [facility] 164 10/08/2020    [facility] 150 04/21/2020         Impression:  1  Type 2 diabetes mellitus without complication, without long-term current use of insulin ([address] )    2  Other hyperlipidemia    3  Overweight (BMI 25 0-29  9)         Plan:    Diagnoses and all orders for this visit:    Type 2 diabetes mellitus without complication, without long-term current use of insulin ([address] )  He is controlled with reported glucose 100-180mg/dL range  Repeat A1c is pending but is likely to be better than 8% last time  He seems to have some postprandial hyperglycemia associated with increased urinary frequency as well as some nocturia causing sleep disturbance  He wishes to try and monitor glucose off of Empagliflozin  I agree as improved sleep might improve overall quality of life and reduce the mild fasting hyperglycemia      Explained corina phenomenon and potential sleep apnea contributing to fasting hyperglycemia  Recommend a professional CGM for 2 weeks to provide better fasting and postprandial data  He will check with insurance for coverage of CPT codes [phone] and [phone]  Advised medical fitness with resistance training  Follow up in 6 months   -     Hemoglobin A1C; Future  -     Microalbumin / creatinine urine ratio; Future    Other hyperlipidemia  ASCVD risk was 25 8%  non HDL cholesterol was 160mg/dl  Will benefit from a statin but patient is not keen to adding medications  Since he has made lifestyle changes, will wait till next lipid panel  Overweight  Discussed ideal BMI and my recommendation will be a BMI 23-26  Lower BMI <22 improves CV risk but reduced QoL  I have spent 35  minutes with patient today in which greater than 50% of this time was spent in counseling/coordination of care  Discussed with the patient and all questioned fully answered  He will call me if any problems arise  Educated/ Counseled patient on diagnostic test results, prognosis, risk vs benefit of treatment options, importance of treatment compliance, healthy life and lifestyle choices        Aayush Thayer

## 2022-07-12 DIAGNOSIS — E11.9 TYPE 2 DIABETES MELLITUS WITHOUT COMPLICATION, WITHOUT LONG-TERM CURRENT USE OF INSULIN (HCC): ICD-10-CM

## 2022-07-12 RX ORDER — DULAGLUTIDE 1.5 MG/.5ML
1.5 INJECTION, SOLUTION SUBCUTANEOUS WEEKLY
Qty: 6 ML | Refills: 3 | Status: SHIPPED | OUTPATIENT
Start: 2022-07-12

## 2022-07-29 DIAGNOSIS — E11.9 TYPE 2 DIABETES MELLITUS WITHOUT COMPLICATION, WITHOUT LONG-TERM CURRENT USE OF INSULIN (HCC): ICD-10-CM

## 2022-11-07 DIAGNOSIS — E11.9 TYPE 2 DIABETES MELLITUS WITHOUT COMPLICATION, WITHOUT LONG-TERM CURRENT USE OF INSULIN (HCC): ICD-10-CM

## 2022-11-08 NOTE — TELEPHONE ENCOUNTER
Metformin refilled for 30 days, it has been more than 1 year since Dr Luis Rojas has seen Tamiko Gudino  Please ask patient to schedule office visit

## 2022-12-09 ENCOUNTER — APPOINTMENT (OUTPATIENT)
Dept: LAB | Facility: CLINIC | Age: 57
End: 2022-12-09

## 2022-12-09 DIAGNOSIS — E78.2 MIXED HYPERLIPIDEMIA: ICD-10-CM

## 2022-12-09 DIAGNOSIS — E11.9 TYPE 2 DIABETES MELLITUS WITHOUT COMPLICATION, WITHOUT LONG-TERM CURRENT USE OF INSULIN (HCC): ICD-10-CM

## 2022-12-09 LAB
CHOLEST SERPL-MCNC: 171 MG/DL
CREAT UR-MCNC: 176 MG/DL
HDLC SERPL-MCNC: 50 MG/DL
LDLC SERPL CALC-MCNC: 108 MG/DL (ref 0–100)
MICROALBUMIN UR-MCNC: 12.4 MG/L (ref 0–20)
MICROALBUMIN/CREAT 24H UR: 7 MG/G CREATININE (ref 0–30)
NONHDLC SERPL-MCNC: 121 MG/DL
TRIGL SERPL-MCNC: 63 MG/DL

## 2022-12-11 LAB
EST. AVERAGE GLUCOSE BLD GHB EST-MCNC: 169 MG/DL
HBA1C MFR BLD: 7.5 %

## 2022-12-12 ENCOUNTER — OFFICE VISIT (OUTPATIENT)
Dept: ENDOCRINOLOGY | Facility: CLINIC | Age: 57
End: 2022-12-12

## 2022-12-12 VITALS
SYSTOLIC BLOOD PRESSURE: 122 MMHG | WEIGHT: 199 LBS | DIASTOLIC BLOOD PRESSURE: 92 MMHG | TEMPERATURE: 97.1 F | HEIGHT: 72 IN | OXYGEN SATURATION: 99 % | HEART RATE: 96 BPM | BODY MASS INDEX: 26.95 KG/M2

## 2022-12-12 DIAGNOSIS — E78.49 OTHER HYPERLIPIDEMIA: ICD-10-CM

## 2022-12-12 DIAGNOSIS — E11.9 TYPE 2 DIABETES MELLITUS WITHOUT COMPLICATION, WITHOUT LONG-TERM CURRENT USE OF INSULIN (HCC): Primary | ICD-10-CM

## 2022-12-12 RX ORDER — DULAGLUTIDE 3 MG/.5ML
3 INJECTION, SOLUTION SUBCUTANEOUS WEEKLY
Qty: 6 ML | Refills: 0 | Status: SHIPPED | OUTPATIENT
Start: 2022-12-12

## 2022-12-12 NOTE — PROGRESS NOTES
Follow-up Patient Progress Note      CC: type 2 diabetes     History of Present Illness:   55 yr male with type 2 diabetes for 3 years, obesity, HTN, HLD and sleep disturbance  Last visit was 5/20/22      Since last visit, he gained 3lbs  He does not check capillary glucose  Stopped jardiance due to increased urinary frequency that affects work      Home blood glucose monitoring: reports morning hyperglycemia upto 140mg/dL    Before breakfast: 100-160mg/dL  Before lunch:   Before dinner: 90-150mg/dL  Bedtime:   Hypoglycemia:No     Current meds:  Metformin 7200SU PO BID  Trulicity 6 3EZ weekly     Opthamology: yes- no retinopathy  Podiatry: No  Last foot exam 10/21  vaccination: Yes  Dental:  Pancreatitis: No     Ace/ARB: valsartan  Statin: No  Thyroid issues: No    Patient Active Problem List   Diagnosis   • Type 2 diabetes mellitus without complication, without long-term current use of insulin ([facility] [address] )   • Essential hypertension   • Other hyperlipidemia     History reviewed  No pertinent past medical history  Past Surgical History:   Procedure Laterality Date   • KNEE SURGERY Left       Family History   Problem Relation Age of Onset   • Diabetes type II Mother    • Hypertension Mother    • Diabetes type II Father    • Colon cancer Father    • Hypertension Father      Social History     Tobacco Use   • Smoking status: Some Days   • Smokeless tobacco: Never   Substance Use Topics   • Alcohol use:  Yes     Allergies   Allergen Reactions   • Penicillin V Rash         Current Outpatient Medications:   •  Dulaglutide (Trulicity) 1 5 LX/0 5NH SOPN, Inject 0 5 mL (1 5 mg total) under the skin once a week, Disp: 6 mL, Rfl: 3  •  Empagliflozin (Jardiance) 10 MG TABS, Take 1 tablet (10 mg total) by mouth every morning, Disp: 30 tablet, Rfl: 0  •  Empagliflozin (Jardiance) 25 MG TABS, Take 1 tablet (25 mg total) by mouth every morning (Patient not taking: Reported on 5/20/2022), Disp: 90 tablet, Rfl: 0  •  metFORMIN (GLUCOPHAGE) 1000 MG tablet, TAKE ONE TABLET BY MOUTH 2 TIMES A DAY WITH MEALS, Disp: 180 tablet, Rfl: 0  •  metFORMIN (GLUCOPHAGE) 1000 MG tablet, Take 1 tablet (1,000 mg total) by mouth 2 (two) times a day with meals, Disp: 60 tablet, Rfl: 0  •  valsartan (DIOVAN) 80 mg tablet, Take 1 tablet (80 mg total) by mouth daily (Patient not taking: Reported on 5/20/2022), Disp: 60 tablet, Rfl: 0    Review of Systems   Constitutional: Positive for activity change, appetite change and fatigue  HENT: Negative  Eyes: Negative  Respiratory: Negative  Cardiovascular: Negative for chest pain  Gastrointestinal: Negative  Endocrine: Negative  Genitourinary: Negative  Musculoskeletal: Negative  Skin: Negative  Allergic/Immunologic: Negative  Neurological: Negative  Hematological: Negative  Psychiatric/Behavioral: Negative  All other systems reviewed and are negative  Physical Exam:  Body mass index is 26 99 kg/m²  /92 (BP Location: Right arm, Patient Position: Sitting, Cuff Size: Large)   Pulse 96   Temp (!) 97 1 °F (36 2 °C) (Tympanic)   Ht 6' (1 829 m)   Wt 90 3 kg (199 lb)   SpO2 99%   BMI 26 99 kg/m²    Vitals:    12/12/22 0812   Weight: 90 3 kg (199 lb)        Physical Exam  Constitutional:       Appearance: He is well-developed  HENT:      Head: Normocephalic  Eyes:      Pupils: Pupils are equal, round, and reactive to light  Neck:      Thyroid: No thyromegaly  Cardiovascular:      Rate and Rhythm: Normal rate  Heart sounds: Normal heart sounds  Pulmonary:      Effort: Pulmonary effort is normal       Breath sounds: Normal breath sounds  Abdominal:      General: Bowel sounds are normal       Palpations: Abdomen is soft  Musculoskeletal:         General: No deformity  Cervical back: Normal range of motion  Skin:     Capillary Refill: Capillary refill takes less than 2 seconds  Coloration: Skin is not pale  Findings: No rash  Neurological:      Mental Status: He is alert and oriented to person, place, and time  Labs:   Lab Results   Component Value Date    HGBA1C 7 5 (H) 12/09/2022       Lab Results   Component Value Date    [id number] 0 648 01/28/2022       Lab Results   Component Value Date    CREATININE 0 78 10/14/2021    CREATININE 0 79 07/01/2021    CREATININE 0 88 10/08/2020    BUN 19 10/14/2021    K 4 4 10/14/2021     (H) 10/14/2021    CO2 26 10/14/2021     eGFR   Date Value Ref Range Status   10/14/2021 102 ml/min/1 73sq m Final       Lab Results   Component Value Date    ALT 22 10/14/2021    AST 7 10/14/2021    ALKPHOS 67 10/14/2021       Lab Results   Component Value Date    CHOLESTEROL 171 12/09/2022    CHOLESTEROL 219 (H) 10/14/2021    CHOLESTEROL 184 07/01/2021     Lab Results   Component Value Date    HDL 50 12/09/2022    HDL 46 10/14/2021    HDL 41 07/01/2021     Lab Results   Component Value Date    TRIG 63 12/09/2022    TRIG 77 10/14/2021    TRIG 94 07/01/2021     Lab Results   Component Value Date    NONHDLC 121 12/09/2022    [place] 143 07/01/2021    [place] 164 10/08/2020         Impression:  1  Type 2 diabetes mellitus without complication, without long-term current use of insulin ([address] )    2  Other hyperlipidemia         Plan:    Latoya Sosa was seen today for follow-up  Diagnoses and all orders for this visit:    Type 2 diabetes mellitus without complication, without long-term current use of insulin ([address] )  He is better controlled with A1c 7 5%  Goal is 6 5%  Today we agreed on increasing Trulicity, continue metformin and add resistance training/muscle building activity to improve both diabetes and hypercholesterolemia  Advised to use a personal Neo cgm once ever 2-3 months to monitor glucose via [employer] account  Follow in 6 months    -     Dulaglutide (Trulicity) 3 TG/6 0BV SOPN; Inject 0 5 mL (3 mg total) under the skin once a week  -     Hemoglobin A1C; Future  -     Microalbumin / creatinine urine ratio; Future  -     metFORMIN (GLUCOPHAGE) 1000 MG tablet; Take 1 tablet (1,000 mg total) by mouth 2 (two) times a day with meals    Other hyperlipidemia  ASCVD risk was 18% down form 25%  LDL improved with diet and lifestyle changes  He is reluctant to use a statin  Advised exercise and diet control to help improve hypercholesterolemia  I have spent 35 minutes with patient today in which greater than 50% of this time was spent in counseling/coordination of care  Discussed with the patient and all questioned fully answered  He will call me if any problems arise  Educated/ Counseled patient on diagnostic test results, prognosis, risk vs benefit of treatment options, importance of treatment compliance, healthy life and lifestyle choices        [address]

## 2022-12-29 ENCOUNTER — TELEPHONE (OUTPATIENT)
Dept: ENDOCRINOLOGY | Facility: CLINIC | Age: 57
End: 2022-12-29

## 2022-12-29 DIAGNOSIS — E11.9 TYPE 2 DIABETES MELLITUS WITHOUT COMPLICATION, WITHOUT LONG-TERM CURRENT USE OF INSULIN (HCC): Primary | ICD-10-CM

## 2022-12-29 RX ORDER — DULAGLUTIDE 1.5 MG/.5ML
1.5 INJECTION, SOLUTION SUBCUTANEOUS 2 TIMES WEEKLY
Qty: 6 ML | Refills: 2 | Status: SHIPPED | OUTPATIENT
Start: 2022-12-29

## 2022-12-29 NOTE — TELEPHONE ENCOUNTER
Eula Allison a pharmacist w/ [facility] called stating that pt's current dose of Trulicity is on back order  They can only get the 1 5 mg dose in stock  Please send in an updated Rx if ok to switch      Thanks

## 2023-02-14 ENCOUNTER — TELEPHONE (OUTPATIENT)
Dept: ENDOCRINOLOGY | Facility: CLINIC | Age: 58
End: 2023-02-14

## 2023-02-14 DIAGNOSIS — E11.9 TYPE 2 DIABETES MELLITUS WITHOUT COMPLICATION, WITHOUT LONG-TERM CURRENT USE OF INSULIN (HCC): Primary | ICD-10-CM

## 2023-02-14 RX ORDER — DULAGLUTIDE 3 MG/.5ML
3 INJECTION, SOLUTION SUBCUTANEOUS
Qty: 6 ML | Refills: 1 | Status: SHIPPED | OUTPATIENT
Start: 2023-02-14

## 2023-02-14 NOTE — TELEPHONE ENCOUNTER
2/14/23 - This is a continuation of the 12/29/22 message in the pt's chart re Trulicity and [address]  Pt said ins had denied the orig dosage of 3 mg and were out of it, and that is when the script for the 1 5 2X/wk was sent  Pt just said [facility] went back to his ins and sent the pt an emergency supply that will last until the end of Feb      Pt said the pharmacy informed him the 3 mg is now available (that had been on back order), and pt wants Dr Evelia Nickerson to send in the refill with the orig 3 mg once/wk prescription  Any questions, please call the pt right away  Pt also sent this to Dr Evelia Nickerson via [address]

## 2023-02-15 DIAGNOSIS — E11.9 TYPE 2 DIABETES MELLITUS WITHOUT COMPLICATION, WITHOUT LONG-TERM CURRENT USE OF INSULIN (HCC): ICD-10-CM

## 2023-05-05 DIAGNOSIS — E11.9 TYPE 2 DIABETES MELLITUS WITHOUT COMPLICATION, WITHOUT LONG-TERM CURRENT USE OF INSULIN (HCC): ICD-10-CM

## 2023-05-09 DIAGNOSIS — E11.9 TYPE 2 DIABETES MELLITUS WITHOUT COMPLICATION, WITHOUT LONG-TERM CURRENT USE OF INSULIN (HCC): ICD-10-CM

## 2023-07-31 ENCOUNTER — TELEPHONE (OUTPATIENT)
Dept: ENDOCRINOLOGY | Facility: CLINIC | Age: 58
End: 2023-07-31

## 2023-07-31 NOTE — TELEPHONE ENCOUNTER
Patient is seeing a Parad[facility]ist. Can those issues impact patients A1C? Patient states he is going to schedule a follow up after that appointment.

## 2023-08-04 NOTE — TELEPHONE ENCOUNTER
A message was left informing patient of provider's note. Office  number was provider should patient have any questions.

## 2023-08-17 DIAGNOSIS — E11.9 TYPE 2 DIABETES MELLITUS WITHOUT COMPLICATION, WITHOUT LONG-TERM CURRENT USE OF INSULIN (HCC): ICD-10-CM

## 2023-08-17 RX ORDER — DULAGLUTIDE 3 MG/.5ML
3 INJECTION, SOLUTION SUBCUTANEOUS
Qty: 6 ML | Refills: 0 | Status: SHIPPED | OUTPATIENT
Start: 2023-08-17

## 2023-09-06 ENCOUNTER — TELEMEDICINE (OUTPATIENT)
Dept: ENDOCRINOLOGY | Facility: CLINIC | Age: 58
End: 2023-09-06
Payer: COMMERCIAL

## 2023-09-06 DIAGNOSIS — E78.49 OTHER HYPERLIPIDEMIA: ICD-10-CM

## 2023-09-06 DIAGNOSIS — E55.9 VITAMIN D DEFICIENCY: ICD-10-CM

## 2023-09-06 DIAGNOSIS — E11.9 TYPE 2 DIABETES MELLITUS WITHOUT COMPLICATION, WITHOUT LONG-TERM CURRENT USE OF INSULIN (HCC): Primary | ICD-10-CM

## 2023-09-06 PROCEDURE — 99214 OFFICE O/P EST MOD 30 MIN: CPT | Performed by: INTERNAL MEDICINE

## 2023-09-06 NOTE — PROGRESS NOTES
REQUIRED DOCUMENTATION:      1. This service was provided via [employer]  2. Provider located at [place], [place]. 3. TeleMed provider: Rachele Bermeo MD.  4. Identify all parties in room with patient during tele consult: 5. Patient was then informed that this was a Telemedicine visit and that the exam was being conducted confidentially over secure lines. My office door was closed. No one else was in the room. Patient acknowledged consent and understanding of privacy and security of the Telemedicine visit, and gave us permission to have the assistant stay in the room in order to assist with the history and to conduct the exam.  I informed the patient that I have reviewed their record in Epic and presented the opportunity for them to ask any questions regarding the visit today. The patient agreed to participate. Patient is aware this is a billable service. Follow-up Patient Progress Note    CC: type 2 diabetes     History of Present Illness:   55 yr male with type 2 diabetes for 3 years, obesity, HTN, HLD and sleep disturbance. Last visit was /20/22.     Since last visit, he lost 20 lbs. He had some tooth extraction recently and laser therapy for pablito-dontal disease. Since then he has had significant hyperglycemia with symptoms.     Home blood glucose monitoring: reports hyperglycemia upto 200mg/dL.       Current meds:  Metformin 2124QQ PO BID  Trulicity 3mg weekly     Opthamology: yes- no retinopathy  Podiatry: No. Last foot exam 10/21  vaccination: Yes  Dental:  Pancreatitis: No     Ace/ARB: valsartan  Statin: No  Thyroid issues: No       Patient Active Problem List   Diagnosis   • Type 2 diabetes mellitus without complication, without long-term current use of insulin ([address])   • Essential hypertension   • Other hyperlipidemia     No past medical history on file.    Past Surgical History:   Procedure Laterality Date   • KNEE SURGERY Left       Family History   Problem Relation Age of Onset   • Diabetes type II Mother    • Hypertension Mother    • Diabetes type II Father    • Colon cancer Father    • Hypertension Father      Social History     Tobacco Use   • Smoking status: Some Days   • Smokeless tobacco: Never   Substance Use Topics   • Alcohol use: Yes     Allergies   Allergen Reactions   • Penicillin V Rash         Current Outpatient Medications:   •  dulaglutide (Trulicity) 3 HJ/3.8YY injection, Inject 0.5 mL (3 mg total) under the skin every 7 days, Disp: 6 mL, Rfl: 0  •  metFORMIN (GLUCOPHAGE) 1000 MG tablet, Take 1 tablet (1,000 mg total) by mouth 2 (two) times a day with meals, Disp: 180 tablet, Rfl: 0  •  valsartan (DIOVAN) 80 mg tablet, Take 1 tablet (80 mg total) by mouth daily (Patient not taking: Reported on 5/20/2022), Disp: 60 tablet, Rfl: 0    Review of Systems   Constitutional: Positive for activity change and appetite change. HENT: Negative. Eyes: Negative. Respiratory: Negative. Cardiovascular: Negative for chest pain. Gastrointestinal: Negative. Endocrine: Negative. Genitourinary: Negative. Musculoskeletal: Negative. Skin: Negative. Allergic/Immunologic: Negative. Neurological: Negative. Hematological: Negative. Psychiatric/Behavioral: Negative. All other systems reviewed and are negative. Physical Exam:  There is no height or weight on file to calculate BMI. There were no vitals taken for this visit. There were no vitals filed for this visit. Physical Exam  Constitutional:       General: He is not in acute distress. Appearance: He is well-developed. He is not ill-appearing. HENT:      Head: Normocephalic and atraumatic. Nose: Nose normal.      Mouth/Throat:      Pharynx: Oropharynx is clear. Eyes:      Extraocular Movements: Extraocular movements intact. Conjunctiva/sclera: Conjunctivae normal.   Neck:      Thyroid: No thyromegaly. Cardiovascular:      Rate and Rhythm: Normal rate.    Pulmonary: Effort: Pulmonary effort is normal.   Musculoskeletal:         General: No deformity. Cervical back: Normal range of motion. Skin:     Capillary Refill: Capillary refill takes less than 2 seconds. Coloration: Skin is not pale. Findings: No rash. Neurological:      Mental Status: He is alert and oriented to person, place, and time. Psychiatric:         Behavior: Behavior normal.         Labs:   Lab Results   Component Value Date    HGBA1C 7.5 (H) 12/09/2022       Lab Results   Component Value Date    MCZ1OYHLOCTS 0.648 01/28/2022       Lab Results   Component Value Date    CREATININE 0.78 10/14/2021    CREATININE 0.79 07/01/2021    CREATININE 0.88 10/08/2020    BUN 19 10/14/2021    K 4.4 10/14/2021     (H) 10/14/2021    CO2 26 10/14/2021     eGFR   Date Value Ref Range Status   10/14/2021 102 ml/min/1.73sq m Final       Lab Results   Component Value Date    ALT 22 10/14/2021    AST 7 10/14/2021    ALKPHOS 67 10/14/2021       Lab Results   Component Value Date    CHOLESTEROL 171 12/09/2022    CHOLESTEROL 219 (H) 10/14/2021    CHOLESTEROL 184 07/01/2021     Lab Results   Component Value Date    HDL 50 12/09/2022    HDL 46 10/14/2021    HDL 41 07/01/2021     Lab Results   Component Value Date    TRIG 63 12/09/2022    TRIG 77 10/14/2021    TRIG 94 07/01/2021     Lab Results   Component Value Date    NONHDLC 121 12/09/2022    [address] 143 07/01/2021    [address] 164 10/08/2020         Impression:  1. Type 2 diabetes mellitus without complication, without long-term current use of insulin ([address])    2. Other hyperlipidemia    3. Vitamin D deficiency         Plan:    Diagnoses and all orders for this visit:    Type 2 diabetes mellitus without complication, without long-term current use of insulin ([address] He reports significant hyperglycemia over the past 2 weeks after periodontal procedure.   A1c was 7.5%    Today we discussed options and agreed to continue with metformin 1000 mg p.o. twice daily and Trulicity 3 mg q. weekly. Given both fasting and postprandial hyperglycemia, we agreed to initiate basal insulin Lantus 10 to 12 units nightly. We will use a personal CGM for the next 2 weeks based on which data, we will make further changes to his regimen. A sample for danika 3 was provided to patient. Follow-up in 3 to 6 months.    -     Hemoglobin A1C; Future  -     Albumin / creatinine urine ratio; Future  -     Comprehensive metabolic panel; Future    Other hyperlipidemia. His ASCVD risk score is 19.5%. He does not wish to use statin therapy for intolerance. Last lipid profile was 12/9/2022. We will repeat labs. Vitamin D deficiency. Advise vitamin D3 2000IU daily OTC. I have spent 32 minutes with patient today in which greater than 50% of this time was spent in counseling/coordination of care. Discussed with the patient and all questioned fully answered. He will call me if any problems arise. Educated/ Counseled patient on diagnostic test results, prognosis, risk vs benefit of treatment options, importance of treatment compliance, healthy life and lifestyle choices.       Raimundo

## 2023-09-07 ENCOUNTER — TELEPHONE (OUTPATIENT)
Dept: ENDOCRINOLOGY | Facility: CLINIC | Age: 58
End: 2023-09-07

## 2023-09-07 NOTE — TELEPHONE ENCOUNTER
There was a Oral Surgery report pt brought in for you to review. It has been attached to the virtual visit from 9/6 for you.

## 2023-09-20 DIAGNOSIS — E11.65 TYPE 2 DIABETES MELLITUS WITH HYPERGLYCEMIA, WITHOUT LONG-TERM CURRENT USE OF INSULIN (HCC): Primary | ICD-10-CM

## 2023-09-20 RX ORDER — INSULIN DEGLUDEC INJECTION 100 U/ML
15 INJECTION, SOLUTION SUBCUTANEOUS
Qty: 15 ML | Refills: 0 | Status: SHIPPED | OUTPATIENT
Start: 2023-09-20

## 2023-09-28 ENCOUNTER — APPOINTMENT (OUTPATIENT)
Dept: LAB | Facility: CLINIC | Age: 58
End: 2023-09-28
Payer: COMMERCIAL

## 2023-09-28 DIAGNOSIS — E55.9 VITAMIN D DEFICIENCY: ICD-10-CM

## 2023-09-28 DIAGNOSIS — E11.9 TYPE 2 DIABETES MELLITUS WITHOUT COMPLICATION, WITHOUT LONG-TERM CURRENT USE OF INSULIN (HCC): ICD-10-CM

## 2023-09-28 DIAGNOSIS — E78.49 OTHER HYPERLIPIDEMIA: ICD-10-CM

## 2023-09-28 LAB
25(OH)D3 SERPL-MCNC: 30.6 NG/ML (ref 30–100)
ALBUMIN SERPL BCP-MCNC: 4.6 G/DL (ref 3.5–5)
ALP SERPL-CCNC: 55 U/L (ref 34–104)
ALT SERPL W P-5'-P-CCNC: 18 U/L (ref 7–52)
ANION GAP SERPL CALCULATED.3IONS-SCNC: 8 MMOL/L
AST SERPL W P-5'-P-CCNC: 15 U/L (ref 13–39)
BILIRUB SERPL-MCNC: 0.73 MG/DL (ref 0.2–1)
BUN SERPL-MCNC: 16 MG/DL (ref 5–25)
CALCIUM SERPL-MCNC: 9.8 MG/DL (ref 8.4–10.2)
CHLORIDE SERPL-SCNC: 108 MMOL/L (ref 96–108)
CHOLEST SERPL-MCNC: 173 MG/DL
CO2 SERPL-SCNC: 26 MMOL/L (ref 21–32)
CREAT SERPL-MCNC: 0.73 MG/DL (ref 0.6–1.3)
CREAT UR-MCNC: 117.2 MG/DL
EST. AVERAGE GLUCOSE BLD GHB EST-MCNC: 252 MG/DL
GFR SERPL CREATININE-BSD FRML MDRD: 102 ML/MIN/1.73SQ M
GLUCOSE P FAST SERPL-MCNC: 95 MG/DL (ref 65–99)
HBA1C MFR BLD: 10.4 %
HDLC SERPL-MCNC: 58 MG/DL
LDLC SERPL CALC-MCNC: 105 MG/DL (ref 0–100)
MICROALBUMIN UR-MCNC: <7 MG/L
MICROALBUMIN/CREAT 24H UR: <6 MG/G CREATININE (ref 0–30)
NONHDLC SERPL-MCNC: 115 MG/DL
POTASSIUM SERPL-SCNC: 4.1 MMOL/L (ref 3.5–5.3)
PROT SERPL-MCNC: 7.4 G/DL (ref 6.4–8.4)
SODIUM SERPL-SCNC: 142 MMOL/L (ref 135–147)
TRIGL SERPL-MCNC: 49 MG/DL

## 2023-09-28 PROCEDURE — 82570 ASSAY OF URINE CREATININE: CPT

## 2023-09-28 PROCEDURE — 82306 VITAMIN D 25 HYDROXY: CPT

## 2023-09-28 PROCEDURE — 80053 COMPREHEN METABOLIC PANEL: CPT

## 2023-09-28 PROCEDURE — 83036 HEMOGLOBIN GLYCOSYLATED A1C: CPT

## 2023-09-28 PROCEDURE — 82043 UR ALBUMIN QUANTITATIVE: CPT

## 2023-09-28 PROCEDURE — 80061 LIPID PANEL: CPT

## 2023-09-28 PROCEDURE — 36415 COLL VENOUS BLD VENIPUNCTURE: CPT

## 2023-10-16 DIAGNOSIS — I10 ESSENTIAL HYPERTENSION: ICD-10-CM

## 2023-10-16 RX ORDER — VALSARTAN 160 MG/1
160 TABLET ORAL DAILY
Qty: 90 TABLET | Refills: 0 | OUTPATIENT
Start: 2023-10-16

## 2023-10-16 RX ORDER — VALSARTAN 80 MG/1
80 TABLET ORAL DAILY
Qty: 60 TABLET | Refills: 0 | OUTPATIENT
Start: 2023-10-16

## 2023-10-19 ENCOUNTER — TELEPHONE (OUTPATIENT)
Dept: PULMONOLOGY | Facility: CLINIC | Age: 58
End: 2023-10-19

## 2023-11-06 DIAGNOSIS — E11.9 TYPE 2 DIABETES MELLITUS WITHOUT COMPLICATION, WITHOUT LONG-TERM CURRENT USE OF INSULIN (HCC): ICD-10-CM

## 2023-11-06 RX ORDER — DULAGLUTIDE 3 MG/.5ML
3 INJECTION, SOLUTION SUBCUTANEOUS
Qty: 2 ML | Refills: 0 | Status: SHIPPED | OUTPATIENT
Start: 2023-11-06

## 2023-11-08 LAB
DME PARACHUTE DELIVERY DATE REQUESTED: NORMAL
DME PARACHUTE ITEM DESCRIPTION: NORMAL
DME PARACHUTE ITEM DESCRIPTION: NORMAL
DME PARACHUTE ORDER STATUS: NORMAL
DME PARACHUTE SUPPLIER NAME: NORMAL
DME PARACHUTE SUPPLIER PHONE: NORMAL

## 2023-12-01 ENCOUNTER — TELEPHONE (OUTPATIENT)
Dept: FAMILY MEDICINE CLINIC | Facility: CLINIC | Age: 58
End: 2023-12-01

## 2023-12-01 NOTE — LETTER
Darryn Fields  [address] [place] [address]      12/1/2023       Dear Darryn     Records from Insurance indicate that you are a patient of Colt Figueroa MD with [facility], [facility]. Your last office visit was on 7/9/2021.  Please call the office to ensure you remain established and to schedule your Annual Physical at [phone].    If you are seeing a primary care provider other than the one listed above, you can simply call the number on the back of your insurance card to change your primary care physician with your insurance company.    We thank you for choosing [facility] for your healthcare needs.    Sincerely,    Kari Lopez MA  Practice Based   [facility]

## 2023-12-04 DIAGNOSIS — E11.9 TYPE 2 DIABETES MELLITUS WITHOUT COMPLICATION, WITHOUT LONG-TERM CURRENT USE OF INSULIN (HCC): ICD-10-CM

## 2023-12-05 RX ORDER — DULAGLUTIDE 3 MG/.5ML
3 INJECTION, SOLUTION SUBCUTANEOUS
Qty: 2 ML | Refills: 0 | Status: SHIPPED | OUTPATIENT
Start: 2023-12-05

## 2023-12-19 DIAGNOSIS — E11.9 TYPE 2 DIABETES MELLITUS WITHOUT COMPLICATION, WITHOUT LONG-TERM CURRENT USE OF INSULIN (HCC): ICD-10-CM

## 2023-12-26 DIAGNOSIS — E11.9 TYPE 2 DIABETES MELLITUS WITHOUT COMPLICATION, WITHOUT LONG-TERM CURRENT USE OF INSULIN (HCC): Primary | ICD-10-CM

## 2023-12-26 RX ORDER — PEN NEEDLE, DIABETIC 32GX 5/32"
NEEDLE, DISPOSABLE MISCELLANEOUS
Qty: 100 EACH | Refills: 0 | Status: SHIPPED | OUTPATIENT
Start: 2023-12-26

## 2023-12-28 ENCOUNTER — DOCUMENTATION (OUTPATIENT)
Dept: FAMILY MEDICINE CLINIC | Facility: CLINIC | Age: 58
End: 2023-12-28

## 2023-12-28 VITALS — DIASTOLIC BLOOD PRESSURE: 88 MMHG | SYSTOLIC BLOOD PRESSURE: 138 MMHG

## 2023-12-29 ENCOUNTER — CLINICAL SUPPORT (OUTPATIENT)
Dept: FAMILY MEDICINE CLINIC | Facility: CLINIC | Age: 58
End: 2023-12-29
Payer: COMMERCIAL

## 2023-12-29 DIAGNOSIS — E11.65 TYPE 2 DIABETES MELLITUS WITH HYPERGLYCEMIA, WITHOUT LONG-TERM CURRENT USE OF INSULIN (HCC): Primary | ICD-10-CM

## 2023-12-29 LAB — SL AMB POCT HEMOGLOBIN AIC: 7.4 (ref ?–6.5)

## 2023-12-29 PROCEDURE — 83036 HEMOGLOBIN GLYCOSYLATED A1C: CPT | Performed by: FAMILY MEDICINE

## 2023-12-29 PROCEDURE — 99211 OFF/OP EST MAY X REQ PHY/QHP: CPT | Performed by: FAMILY MEDICINE

## 2024-01-16 ENCOUNTER — OFFICE VISIT (OUTPATIENT)
Dept: FAMILY MEDICINE CLINIC | Facility: CLINIC | Age: 59
End: 2024-01-16
Payer: COMMERCIAL

## 2024-01-16 VITALS
DIASTOLIC BLOOD PRESSURE: 92 MMHG | OXYGEN SATURATION: 100 % | WEIGHT: 186.8 LBS | HEIGHT: 72 IN | RESPIRATION RATE: 16 BRPM | BODY MASS INDEX: 25.3 KG/M2 | SYSTOLIC BLOOD PRESSURE: 130 MMHG | TEMPERATURE: 97.5 F | HEART RATE: 103 BPM

## 2024-01-16 DIAGNOSIS — Z12.11 ENCOUNTER FOR SCREENING COLONOSCOPY: Primary | ICD-10-CM

## 2024-01-16 DIAGNOSIS — Z00.00 PERIODIC HEALTH ASSESSMENT, GENERAL SCREENING, ADULT: ICD-10-CM

## 2024-01-16 DIAGNOSIS — E11.9 TYPE 2 DIABETES MELLITUS WITHOUT COMPLICATION, WITHOUT LONG-TERM CURRENT USE OF INSULIN (HCC): ICD-10-CM

## 2024-01-16 DIAGNOSIS — I10 ESSENTIAL HYPERTENSION: ICD-10-CM

## 2024-01-16 PROCEDURE — 99213 OFFICE O/P EST LOW 20 MIN: CPT | Performed by: FAMILY MEDICINE

## 2024-01-16 PROCEDURE — 99396 PREV VISIT EST AGE 40-64: CPT | Performed by: FAMILY MEDICINE

## 2024-01-16 NOTE — ASSESSMENT & PLAN NOTE
Hypertension.  Patient blood pressure is stable at this time.  He does check his blood pressures at and states they usually averaging in the 130s over 80s.  Patient's valsartan was discontinued by his endocrinologist

## 2024-01-16 NOTE — PROGRESS NOTES
FAMILY PRACTICE OFFICE VISIT       NAME: Darryn Fields  AGE: 58 y.o. SEX: male       : 1965        MRN: [id number]    DATE: 2024  TIME: 7:59 AM    Assessment and Plan     Problem List Items Addressed This Visit       Type 2 diabetes mellitus without complication, without long-term current use of insulin (HCC)     Diabetes.  Patient's A1c has improved to 7.4 by using his insulin and Trulicity.  Patient will continue with metformin and follow-up with endocrinologist in 2024.  His foot exam is up-to-date.  Patient will be seeing his ophthalmologist within the next 2 months.  Lab Results   Component Value Date    HGBA1C 7.4 (A) 2023            Relevant Orders    PSA, Total Screen    CBC    Comprehensive metabolic panel    Lipid panel    Hemoglobin A1C    Essential hypertension     Hypertension.  Patient blood pressure is stable at this time.  He does check his blood pressures at and states they usually averaging in the 130s over 80s.  Patient's valsartan was discontinued by his endocrinologist         Relevant Orders    PSA, Total Screen    CBC    Comprehensive metabolic panel    Lipid panel    Hemoglobin A1C    Periodic health assessment, general screening, adult     Well adult.  Patient will try to restart his regular form of exercise at the gym.  He was given referral to have initial screening colonoscopy.  He will obtain blood work as ordered for further evaluation.         Relevant Orders    PSA, Total Screen    CBC    Comprehensive metabolic panel    Lipid panel    Hemoglobin A1C    Encounter for screening colonoscopy - Primary    Relevant Orders    Ambulatory Referral to Colorectal Surgery           Chief Complaint     Chief Complaint   Patient presents with    Annual Exam     Socks and shoes removed for DM Foot Exam       History of Present Illness     Patient in the office for annual wellness exam.        Review of Systems   Review of Systems   Constitutional: Negative.    HENT:  Negative.     Eyes: Negative.    Respiratory: Negative.     Cardiovascular: Negative.    Gastrointestinal: Negative.    Genitourinary:         Nocturia x 2-3   Musculoskeletal: Negative.    Skin: Negative.    Neurological: Negative.    Psychiatric/Behavioral: Negative.         Active Problem List     Patient Active Problem List   Diagnosis    Type 2 diabetes mellitus without complication, without long-term current use of insulin (HCC)    Essential hypertension    Other hyperlipidemia    Periodic health assessment, general screening, adult    Encounter for screening colonoscopy       Past Medical History:  History reviewed. No pertinent past medical history.    Past Surgical History:  Past Surgical History:   Procedure Laterality Date    KNEE SURGERY Left     MOUTH SURGERY  08/2023       Family History:  Family History   Problem Relation Age of Onset    Diabetes type II Mother     Hypertension Mother     Diabetes type II Father     Colon cancer Father     Hypertension Father        Social History:  Social History     Socioeconomic History    Marital status: /Civil Union     Spouse name: Not on file    Number of children: Not on file    Years of education: Not on file    Highest education level: Not on file   Occupational History    Not on file   Tobacco Use    Smoking status: Some Days     Current packs/day: 0.50     Average packs/day: 0.5 packs/day for 15.0 years (7.5 ttl pk-yrs)     Types: Cigarettes    Smokeless tobacco: Never   Vaping Use    Vaping status: Never Used   Substance and Sexual Activity    Alcohol use: Yes     Comment: Social    Drug use: Never    Sexual activity: Not Currently   Other Topics Concern    Not on file   Social History Narrative    Not on file     Social Determinants of Health     Financial Resource Strain: Not on file   Food Insecurity: Not on file   Transportation Needs: Not on file   Physical Activity: Not on file   Stress: Not on file   Social Connections: Not on file    Intimate Partner Violence: Not on file   Housing Stability: Not on file       Objective     Vitals:    01/16/24 0723   BP: 130/92   Pulse: 103   Resp: 16   Temp: 97.5 °F (36.4 °C)   SpO2: 100%     Wt Readings from Last 3 Encounters:   01/16/24 84.7 kg (186 lb 12.8 oz)   12/12/22 90.3 kg (199 lb)   05/20/22 89.3 kg (196 lb 12.8 oz)       Physical Exam  Constitutional:       General: He is not in acute distress.     Appearance: Normal appearance. He is not ill-appearing.   HENT:      Head: Normocephalic and atraumatic.   Eyes:      General:         Right eye: No discharge.         Left eye: No discharge.      Extraocular Movements: Extraocular movements intact.      Conjunctiva/sclera: Conjunctivae normal.      Pupils: Pupils are equal, round, and reactive to light.   Neck:      Vascular: No carotid bruit.   Cardiovascular:      Rate and Rhythm: Normal rate and regular rhythm.      Heart sounds: Normal heart sounds. No murmur heard.  Pulmonary:      Effort: Pulmonary effort is normal.      Breath sounds: Normal breath sounds. No wheezing, rhonchi or rales.   Abdominal:      General: Abdomen is flat. Bowel sounds are normal. There is no distension.      Palpations: Abdomen is soft.      Tenderness: There is no abdominal tenderness. There is no guarding or rebound.   Musculoskeletal:      Right lower leg: No edema.      Left lower leg: No edema.   Lymphadenopathy:      Cervical: No cervical adenopathy.   Skin:     Findings: No rash.   Neurological:      General: No focal deficit present.      Mental Status: He is alert and oriented to person, place, and time.      Cranial Nerves: No cranial nerve deficit.   Psychiatric:         Mood and Affect: Mood normal.         Behavior: Behavior normal.         Thought Content: Thought content normal.         Judgment: Judgment normal.         Pertinent Laboratory/Diagnostic Studies:  Lab Results   Component Value Date    BUN 16 09/28/2023    CREATININE 0.73 09/28/2023    CALCIUM  "9.8 09/28/2023    K 4.1 09/28/2023    CO2 26 09/28/2023     09/28/2023     Lab Results   Component Value Date    ALT 18 09/28/2023    AST 15 09/28/2023    ALKPHOS 55 09/28/2023       Lab Results   Component Value Date    WBC 6.47 07/01/2021    HGB 16.0 07/01/2021    HCT 46.7 07/01/2021    MCV 92 07/01/2021     07/01/2021       No results found for: \"TSH\"    No results found for: \"CHOL\"  Lab Results   Component Value Date    TRIG 49 09/28/2023     Lab Results   Component Value Date    HDL 58 09/28/2023     Lab Results   Component Value Date    LDLCALC 105 (H) 09/28/2023     Lab Results   Component Value Date    HGBA1C 7.4 (A) 12/29/2023       Results for orders placed or performed in visit on 12/29/23   POCT hemoglobin A1c   Result Value Ref Range    Hemoglobin A1C 7.4 (A) 6.5       Orders Placed This Encounter   Procedures    PSA, Total Screen    CBC    Comprehensive metabolic panel    Lipid panel    Hemoglobin A1C    Ambulatory Referral to Colorectal Surgery       ALLERGIES:  Allergies   Allergen Reactions    Penicillin V Rash       Current Medications     Current Outpatient Medications   Medication Sig Dispense Refill    BD Pen Needle Karon U/F 32G X 4 MM MISC USE WITH TRESIBA 100 each 0    dulaglutide (Trulicity) 3 MG/0.5ML injection Inject 0.5 mL (3 mg total) under the skin every 7 days 2 mL 0    insulin degludec (Tresiba FlexTouch) 100 units/mL injection pen Inject 15 Units under the skin daily at bedtime 15 mL 0    metFORMIN (GLUCOPHAGE) 1000 MG tablet Take 1 tablet (1,000 mg total) by mouth 2 (two) times a day with meals 180 tablet 0     No current facility-administered medications for this visit.         Health Maintenance     Health Maintenance   Topic Date Due    Hepatitis C Screening  Never done    Pneumococcal Vaccine: Pediatrics (0 to 5 Years) and At-Risk Patients (6 to 64 Years) (1 - PCV) Never done    HIV Screening  Never done    Annual Physical  Never done    DTaP,Tdap,and Td Vaccines (1 " - Tdap) Never done    Colorectal Cancer Screening  Never done    Zoster Vaccine (1 of 2) Never done    Diabetic Foot Exam  10/12/2021    COVID-19 Vaccine (4 - 2023-24 season) 09/01/2023    HEMOGLOBIN A1C  03/29/2024    DM Eye Exam  02/16/2024 (Originally 9/1/2023)    Kidney Health Evaluation: GFR  09/28/2024    Kidney Health Evaluation: Albumin/Creatinine Ratio  09/28/2024    Depression Screening  01/16/2025    Influenza Vaccine  Completed    HIB Vaccine  Aged Out    IPV Vaccine  Aged Out    Hepatitis A Vaccine  Aged Out    Meningococcal ACWY Vaccine  Aged Out    HPV Vaccine  Aged Out     Immunization History   Administered Date(s) Administered    COVID-19 PFIZER VACCINE 0.3 ML IM 12/22/2020, 01/13/2021, 12/09/2021    Fluzone Split Quad 0.5 mL 10/16/2019    INFLUENZA 10/14/2021, 10/06/2022, 10/05/2023       Colt Figueroa MD

## 2024-01-16 NOTE — ASSESSMENT & PLAN NOTE
Diabetes.  Patient's A1c has improved to 7.4 by using his insulin and Trulicity.  Patient will continue with metformin and follow-up with endocrinologist in March 2024.  His foot exam is up-to-date.  Patient will be seeing his ophthalmologist within the next 2 months.  Lab Results   Component Value Date    HGBA1C 7.4 (A) 12/29/2023

## 2024-01-16 NOTE — ASSESSMENT & PLAN NOTE
Well adult.  Patient will try to restart his regular form of exercise at the gym.  He was given referral to have initial screening colonoscopy.  He will obtain blood work as ordered for further evaluation.

## 2024-01-29 DIAGNOSIS — E11.9 TYPE 2 DIABETES MELLITUS WITHOUT COMPLICATION, WITHOUT LONG-TERM CURRENT USE OF INSULIN (HCC): Primary | ICD-10-CM

## 2024-01-30 RX ORDER — DULAGLUTIDE 3 MG/.5ML
3 INJECTION, SOLUTION SUBCUTANEOUS
Qty: 6 ML | Refills: 0 | Status: SHIPPED | OUTPATIENT
Start: 2024-01-30

## 2024-02-15 DIAGNOSIS — E11.9 TYPE 2 DIABETES MELLITUS WITHOUT COMPLICATION, WITHOUT LONG-TERM CURRENT USE OF INSULIN (HCC): ICD-10-CM

## 2024-02-21 PROBLEM — Z00.00 PERIODIC HEALTH ASSESSMENT, GENERAL SCREENING, ADULT: Status: RESOLVED | Noted: 2024-01-16 | Resolved: 2024-02-21

## 2024-02-26 DIAGNOSIS — E11.9 TYPE 2 DIABETES MELLITUS WITHOUT COMPLICATION, WITHOUT LONG-TERM CURRENT USE OF INSULIN (HCC): ICD-10-CM

## 2024-02-26 RX ORDER — DULAGLUTIDE 3 MG/.5ML
3 INJECTION, SOLUTION SUBCUTANEOUS
Qty: 6 ML | Refills: 0 | Status: SHIPPED | OUTPATIENT
Start: 2024-02-26

## 2024-03-18 DIAGNOSIS — E11.65 TYPE 2 DIABETES MELLITUS WITH HYPERGLYCEMIA, WITHOUT LONG-TERM CURRENT USE OF INSULIN (HCC): ICD-10-CM

## 2024-03-18 RX ORDER — INSULIN DEGLUDEC 100 U/ML
15 INJECTION, SOLUTION SUBCUTANEOUS
Qty: 15 ML | Refills: 0 | Status: SHIPPED | OUTPATIENT
Start: 2024-03-18

## 2024-03-26 DIAGNOSIS — E11.9 TYPE 2 DIABETES MELLITUS WITHOUT COMPLICATION, WITHOUT LONG-TERM CURRENT USE OF INSULIN (HCC): ICD-10-CM

## 2024-03-27 RX ORDER — PEN NEEDLE, DIABETIC 32GX 5/32"
NEEDLE, DISPOSABLE MISCELLANEOUS
Qty: 100 EACH | Refills: 1 | Status: SHIPPED | OUTPATIENT
Start: 2024-03-27

## 2024-05-16 DIAGNOSIS — E11.9 TYPE 2 DIABETES MELLITUS WITHOUT COMPLICATION, WITHOUT LONG-TERM CURRENT USE OF INSULIN (HCC): ICD-10-CM

## 2024-05-16 RX ORDER — DULAGLUTIDE 3 MG/.5ML
3 INJECTION, SOLUTION SUBCUTANEOUS
Qty: 6 ML | Refills: 1 | Status: SHIPPED | OUTPATIENT
Start: 2024-05-16

## 2024-06-14 DIAGNOSIS — E11.9 TYPE 2 DIABETES MELLITUS WITHOUT COMPLICATION, WITHOUT LONG-TERM CURRENT USE OF INSULIN (HCC): ICD-10-CM

## 2024-06-14 RX ORDER — DULAGLUTIDE 3 MG/.5ML
3 INJECTION, SOLUTION SUBCUTANEOUS
Qty: 6 ML | Refills: 1 | Status: SHIPPED | OUTPATIENT
Start: 2024-06-14

## 2024-06-24 DIAGNOSIS — E11.9 TYPE 2 DIABETES MELLITUS WITHOUT COMPLICATION, WITHOUT LONG-TERM CURRENT USE OF INSULIN (HCC): ICD-10-CM

## 2024-06-24 DIAGNOSIS — E11.65 TYPE 2 DIABETES MELLITUS WITH HYPERGLYCEMIA, WITHOUT LONG-TERM CURRENT USE OF INSULIN (HCC): ICD-10-CM

## 2024-06-24 RX ORDER — PEN NEEDLE, DIABETIC 32GX 5/32"
NEEDLE, DISPOSABLE MISCELLANEOUS
Qty: 100 EACH | Refills: 1 | Status: SHIPPED | OUTPATIENT
Start: 2024-06-24

## 2024-06-24 RX ORDER — INSULIN DEGLUDEC 100 U/ML
15 INJECTION, SOLUTION SUBCUTANEOUS
Qty: 15 ML | Refills: 1 | Status: SHIPPED | OUTPATIENT
Start: 2024-06-24

## 2024-06-26 DIAGNOSIS — E11.65 TYPE 2 DIABETES MELLITUS WITH HYPERGLYCEMIA, WITHOUT LONG-TERM CURRENT USE OF INSULIN (HCC): Primary | ICD-10-CM

## 2024-06-26 RX ORDER — DULAGLUTIDE 4.5 MG/.5ML
4.5 INJECTION, SOLUTION SUBCUTANEOUS
Qty: 6 ML | Refills: 0 | Status: SHIPPED | OUTPATIENT
Start: 2024-06-26

## 2024-07-22 DIAGNOSIS — E11.65 TYPE 2 DIABETES MELLITUS WITH HYPERGLYCEMIA, WITHOUT LONG-TERM CURRENT USE OF INSULIN (HCC): ICD-10-CM

## 2024-07-22 RX ORDER — DULAGLUTIDE 4.5 MG/.5ML
4.5 INJECTION, SOLUTION SUBCUTANEOUS
Qty: 6 ML | Refills: 1 | Status: SHIPPED | OUTPATIENT
Start: 2024-07-22

## 2024-08-19 DIAGNOSIS — E11.65 TYPE 2 DIABETES MELLITUS WITH HYPERGLYCEMIA, WITHOUT LONG-TERM CURRENT USE OF INSULIN (HCC): ICD-10-CM

## 2024-08-19 RX ORDER — DULAGLUTIDE 4.5 MG/.5ML
4.5 INJECTION, SOLUTION SUBCUTANEOUS
Qty: 6 ML | Refills: 1 | Status: SHIPPED | OUTPATIENT
Start: 2024-08-19

## 2024-09-16 DIAGNOSIS — E11.65 TYPE 2 DIABETES MELLITUS WITH HYPERGLYCEMIA, WITHOUT LONG-TERM CURRENT USE OF INSULIN (HCC): ICD-10-CM

## 2024-09-17 RX ORDER — INSULIN DEGLUDEC 100 U/ML
15 INJECTION, SOLUTION SUBCUTANEOUS
Qty: 15 ML | Refills: 0 | Status: SHIPPED | OUTPATIENT
Start: 2024-09-17

## 2024-09-17 RX ORDER — DULAGLUTIDE 4.5 MG/.5ML
4.5 INJECTION, SOLUTION SUBCUTANEOUS
Qty: 6 ML | Refills: 0 | Status: SHIPPED | OUTPATIENT
Start: 2024-09-17

## 2024-10-08 DIAGNOSIS — E11.9 TYPE 2 DIABETES MELLITUS WITHOUT COMPLICATION, WITHOUT LONG-TERM CURRENT USE OF INSULIN (HCC): ICD-10-CM

## 2024-10-09 RX ORDER — PEN NEEDLE, DIABETIC 32GX 5/32"
NEEDLE, DISPOSABLE MISCELLANEOUS
Qty: 100 EACH | Refills: 0 | Status: SHIPPED | OUTPATIENT
Start: 2024-10-09

## 2024-10-14 DIAGNOSIS — E11.65 TYPE 2 DIABETES MELLITUS WITH HYPERGLYCEMIA, WITHOUT LONG-TERM CURRENT USE OF INSULIN (HCC): ICD-10-CM

## 2024-11-11 DIAGNOSIS — E11.65 TYPE 2 DIABETES MELLITUS WITH HYPERGLYCEMIA, WITHOUT LONG-TERM CURRENT USE OF INSULIN (HCC): ICD-10-CM

## 2024-11-14 RX ORDER — DULAGLUTIDE 4.5 MG/.5ML
INJECTION, SOLUTION SUBCUTANEOUS
Qty: 6 ML | Refills: 0 | Status: SHIPPED | OUTPATIENT
Start: 2024-11-14

## 2024-12-09 DIAGNOSIS — E11.65 TYPE 2 DIABETES MELLITUS WITH HYPERGLYCEMIA, WITHOUT LONG-TERM CURRENT USE OF INSULIN (HCC): ICD-10-CM

## 2024-12-10 RX ORDER — DULAGLUTIDE 4.5 MG/.5ML
INJECTION, SOLUTION SUBCUTANEOUS
Qty: 6 ML | Refills: 1 | Status: SHIPPED | OUTPATIENT
Start: 2024-12-10

## 2024-12-24 DIAGNOSIS — E11.65 TYPE 2 DIABETES MELLITUS WITH HYPERGLYCEMIA, WITHOUT LONG-TERM CURRENT USE OF INSULIN (HCC): ICD-10-CM

## 2024-12-24 DIAGNOSIS — E11.9 TYPE 2 DIABETES MELLITUS WITHOUT COMPLICATION, WITHOUT LONG-TERM CURRENT USE OF INSULIN (HCC): ICD-10-CM

## 2024-12-24 RX ORDER — PEN NEEDLE, DIABETIC 32GX 5/32"
NEEDLE, DISPOSABLE MISCELLANEOUS
Qty: 100 EACH | Refills: 0 | Status: SHIPPED | OUTPATIENT
Start: 2024-12-24

## 2024-12-24 RX ORDER — INSULIN DEGLUDEC 100 U/ML
15 INJECTION, SOLUTION SUBCUTANEOUS
Qty: 15 ML | Refills: 0 | Status: SHIPPED | OUTPATIENT
Start: 2024-12-24

## 2025-01-02 DIAGNOSIS — E11.65 TYPE 2 DIABETES MELLITUS WITH HYPERGLYCEMIA, WITHOUT LONG-TERM CURRENT USE OF INSULIN (HCC): ICD-10-CM

## 2025-01-03 RX ORDER — DULAGLUTIDE 4.5 MG/.5ML
INJECTION, SOLUTION SUBCUTANEOUS
Qty: 6 ML | Refills: 0 | Status: SHIPPED | OUTPATIENT
Start: 2025-01-03

## 2025-02-03 DIAGNOSIS — E11.65 TYPE 2 DIABETES MELLITUS WITH HYPERGLYCEMIA, WITHOUT LONG-TERM CURRENT USE OF INSULIN (HCC): ICD-10-CM

## 2025-02-06 RX ORDER — DULAGLUTIDE 4.5 MG/.5ML
INJECTION, SOLUTION SUBCUTANEOUS
Qty: 6 ML | Refills: 0 | Status: SHIPPED | OUTPATIENT
Start: 2025-02-06 | End: 2025-02-11 | Stop reason: SDUPTHER

## 2025-02-11 DIAGNOSIS — E11.65 TYPE 2 DIABETES MELLITUS WITH HYPERGLYCEMIA, WITHOUT LONG-TERM CURRENT USE OF INSULIN (HCC): ICD-10-CM

## 2025-02-11 RX ORDER — DULAGLUTIDE 4.5 MG/.5ML
INJECTION, SOLUTION SUBCUTANEOUS
Qty: 6 ML | Refills: 1 | Status: SHIPPED | OUTPATIENT
Start: 2025-02-11

## 2025-02-13 ENCOUNTER — TELEPHONE (OUTPATIENT)
Age: 60
End: 2025-02-13

## 2025-02-13 NOTE — TELEPHONE ENCOUNTER
PA for Dulaglutide (Trulicity) 4.5 MG/0.5ML SUBMITTED to [employer]    via      [x][employer]-Case ID # [id number]    [x]PA sent as URGENT    All office notes, labs and other pertaining documents and studies sent. Clinical questions answered. Awaiting determination from insurance company.     Turnaround time for your insurance to make a decision on your Prior Authorization can take 7-21 business days.

## 2025-02-18 NOTE — TELEPHONE ENCOUNTER
PA for Dulaglutide (Trulicity) 4.5 MG/0.5ML  APPROVED                Pharmacy advised by    [x]Fax  []Phone call    Specialty Pharmacy    []     Approval letter scanned into [employer] Yes

## 2025-03-24 DIAGNOSIS — E11.9 TYPE 2 DIABETES MELLITUS WITHOUT COMPLICATION, WITHOUT LONG-TERM CURRENT USE OF INSULIN (HCC): ICD-10-CM

## 2025-03-31 DIAGNOSIS — E11.65 TYPE 2 DIABETES MELLITUS WITH HYPERGLYCEMIA, WITHOUT LONG-TERM CURRENT USE OF INSULIN (HCC): ICD-10-CM

## 2025-04-02 RX ORDER — INSULIN DEGLUDEC 100 U/ML
15 INJECTION, SOLUTION SUBCUTANEOUS
Qty: 15 ML | Refills: 1 | Status: SHIPPED | OUTPATIENT
Start: 2025-04-02

## 2025-04-09 NOTE — TELEPHONE ENCOUNTER
Patient retuning a call. Attempted to assist patient with scheduling but states he will be scheduling his f/u appointment via [employer]. I advised patient needed to schedule appointment for asap for further refills. Reports he will call back if refill is needed prior to next ov- states he is scheduling soonest available appointment. States he received courtesy refills sent in on both 3/26 and 4/2 and thanks provider.   BOWEN

## 2025-04-25 DIAGNOSIS — E11.9 TYPE 2 DIABETES MELLITUS WITHOUT COMPLICATION, WITHOUT LONG-TERM CURRENT USE OF INSULIN (HCC): ICD-10-CM

## 2025-04-28 DIAGNOSIS — E11.65 TYPE 2 DIABETES MELLITUS WITH HYPERGLYCEMIA, WITHOUT LONG-TERM CURRENT USE OF INSULIN (HCC): ICD-10-CM

## 2025-04-28 RX ORDER — PEN NEEDLE, DIABETIC 32GX 5/32"
NEEDLE, DISPOSABLE MISCELLANEOUS
Qty: 100 EACH | Refills: 1 | Status: SHIPPED | OUTPATIENT
Start: 2025-04-28

## 2025-04-29 RX ORDER — DULAGLUTIDE 4.5 MG/.5ML
INJECTION, SOLUTION SUBCUTANEOUS
Qty: 6 ML | Refills: 0 | Status: SHIPPED | OUTPATIENT
Start: 2025-04-29

## 2025-05-27 DIAGNOSIS — E11.65 TYPE 2 DIABETES MELLITUS WITH HYPERGLYCEMIA, WITHOUT LONG-TERM CURRENT USE OF INSULIN (HCC): ICD-10-CM

## 2025-06-02 RX ORDER — DULAGLUTIDE 4.5 MG/.5ML
INJECTION, SOLUTION SUBCUTANEOUS
Qty: 6 ML | Refills: 0 | Status: SHIPPED | OUTPATIENT
Start: 2025-06-02

## 2025-06-24 DIAGNOSIS — E11.9 TYPE 2 DIABETES MELLITUS WITHOUT COMPLICATION, WITHOUT LONG-TERM CURRENT USE OF INSULIN (HCC): ICD-10-CM

## 2025-07-07 DIAGNOSIS — E11.65 TYPE 2 DIABETES MELLITUS WITH HYPERGLYCEMIA, WITHOUT LONG-TERM CURRENT USE OF INSULIN (HCC): ICD-10-CM

## 2025-07-09 RX ORDER — INSULIN DEGLUDEC 100 U/ML
15 INJECTION, SOLUTION SUBCUTANEOUS
Qty: 15 ML | Refills: 2 | Status: SHIPPED | OUTPATIENT
Start: 2025-07-09

## 2025-07-20 DIAGNOSIS — E11.9 TYPE 2 DIABETES MELLITUS WITHOUT COMPLICATION, WITHOUT LONG-TERM CURRENT USE OF INSULIN (HCC): ICD-10-CM

## 2025-07-20 DIAGNOSIS — E11.65 TYPE 2 DIABETES MELLITUS WITH HYPERGLYCEMIA, WITHOUT LONG-TERM CURRENT USE OF INSULIN (HCC): ICD-10-CM

## 2025-07-22 RX ORDER — DULAGLUTIDE 4.5 MG/.5ML
INJECTION, SOLUTION SUBCUTANEOUS
Qty: 6 ML | Refills: 0 | Status: SHIPPED | OUTPATIENT
Start: 2025-07-22

## 2025-07-22 RX ORDER — PEN NEEDLE, DIABETIC 32GX 5/32"
NEEDLE, DISPOSABLE MISCELLANEOUS
Qty: 100 EACH | Refills: 0 | Status: SHIPPED | OUTPATIENT
Start: 2025-07-22

## 2025-08-13 ENCOUNTER — TELEPHONE (OUTPATIENT)
Dept: FAMILY MEDICINE CLINIC | Facility: CLINIC | Age: 60
End: 2025-08-13

## 2025-08-15 ENCOUNTER — APPOINTMENT (OUTPATIENT)
Dept: LAB | Facility: CLINIC | Age: 60
End: 2025-08-15
Payer: COMMERCIAL

## 2025-08-18 ENCOUNTER — OFFICE VISIT (OUTPATIENT)
Dept: ENDOCRINOLOGY | Facility: CLINIC | Age: 60
End: 2025-08-18
Payer: COMMERCIAL

## 2025-08-18 VITALS
OXYGEN SATURATION: 95 % | DIASTOLIC BLOOD PRESSURE: 86 MMHG | SYSTOLIC BLOOD PRESSURE: 128 MMHG | TEMPERATURE: 97.9 F | RESPIRATION RATE: 14 BRPM | WEIGHT: 172 LBS | BODY MASS INDEX: 23.3 KG/M2 | HEART RATE: 98 BPM | HEIGHT: 72 IN

## 2025-08-18 DIAGNOSIS — E11.9 TYPE 2 DIABETES MELLITUS WITHOUT COMPLICATION, WITHOUT LONG-TERM CURRENT USE OF INSULIN (HCC): ICD-10-CM

## 2025-08-18 DIAGNOSIS — E55.9 VITAMIN D DEFICIENCY: ICD-10-CM

## 2025-08-18 DIAGNOSIS — E11.65 TYPE 2 DIABETES MELLITUS WITH HYPERGLYCEMIA, WITHOUT LONG-TERM CURRENT USE OF INSULIN (HCC): Primary | ICD-10-CM

## 2025-08-18 DIAGNOSIS — E78.49 OTHER HYPERLIPIDEMIA: ICD-10-CM

## 2025-08-18 LAB — SL AMB POCT HEMOGLOBIN AIC: 10.6 (ref ?–6.5)

## 2025-08-18 PROCEDURE — 99214 OFFICE O/P EST MOD 30 MIN: CPT | Performed by: INTERNAL MEDICINE

## 2025-08-18 PROCEDURE — 83036 HEMOGLOBIN GLYCOSYLATED A1C: CPT | Performed by: INTERNAL MEDICINE

## 2025-08-18 RX ORDER — HYDROCHLOROTHIAZIDE 12.5 MG/1
CAPSULE ORAL
Qty: 6 EACH | Refills: 1 | Status: SHIPPED | OUTPATIENT
Start: 2025-08-18

## 2025-08-18 RX ORDER — KETOROLAC TROMETHAMINE 30 MG/ML
1 INJECTION, SOLUTION INTRAMUSCULAR; INTRAVENOUS CONTINUOUS
Qty: 1 EACH | Refills: 0 | Status: SHIPPED | OUTPATIENT
Start: 2025-08-18

## 2025-08-18 RX ORDER — DULAGLUTIDE 4.5 MG/.5ML
INJECTION, SOLUTION SUBCUTANEOUS
Qty: 6 ML | Refills: 0 | Status: SHIPPED | OUTPATIENT
Start: 2025-08-18

## 2025-08-19 DIAGNOSIS — E11.65 TYPE 2 DIABETES MELLITUS WITH HYPERGLYCEMIA, WITHOUT LONG-TERM CURRENT USE OF INSULIN (HCC): ICD-10-CM

## 2025-08-19 RX ORDER — DULAGLUTIDE 4.5 MG/.5ML
INJECTION, SOLUTION SUBCUTANEOUS
Qty: 6 ML | Refills: 0 | OUTPATIENT
Start: 2025-08-19